# Patient Record
Sex: MALE | Race: WHITE | Employment: STUDENT | URBAN - METROPOLITAN AREA
[De-identification: names, ages, dates, MRNs, and addresses within clinical notes are randomized per-mention and may not be internally consistent; named-entity substitution may affect disease eponyms.]

---

## 2017-08-03 PROBLEM — F41.9 ANXIETY: Status: ACTIVE | Noted: 2017-08-03

## 2019-09-13 ENCOUNTER — OFFICE VISIT (OUTPATIENT)
Dept: INTERNAL MEDICINE CLINIC | Facility: CLINIC | Age: 24
End: 2019-09-13
Payer: COMMERCIAL

## 2019-09-13 VITALS
RESPIRATION RATE: 16 BRPM | TEMPERATURE: 98 F | WEIGHT: 175 LBS | DIASTOLIC BLOOD PRESSURE: 72 MMHG | SYSTOLIC BLOOD PRESSURE: 128 MMHG | HEIGHT: 67 IN | HEART RATE: 68 BPM | BODY MASS INDEX: 27.47 KG/M2

## 2019-09-13 DIAGNOSIS — L98.9 SKIN LESION OF LEFT LEG: Primary | ICD-10-CM

## 2019-09-13 PROCEDURE — 99203 OFFICE O/P NEW LOW 30 MIN: CPT | Performed by: INTERNAL MEDICINE

## 2019-09-13 NOTE — PROGRESS NOTES
Stephanie Hendricks  4/13/1995    Patient presents with:  New Patient: cn room 1: new patient here for growth on leg       Yayo García is a 22year old male who presents with a skin growth.      The patient notes a 3-5 year history of a sma respiratory distress. Musculoskeletal: No edema  Skin: Area of an erythematous papule that is nontender and firm, without overlying warmth involving the anterior left lower extremity spanning approximately 2 cm in diameter. No drainage.    Psychiatric: Nor

## 2020-03-27 ENCOUNTER — TELEPHONE (OUTPATIENT)
Dept: INTERNAL MEDICINE CLINIC | Facility: CLINIC | Age: 25
End: 2020-03-27

## 2020-03-27 DIAGNOSIS — K21.9 GASTROESOPHAGEAL REFLUX DISEASE, ESOPHAGITIS PRESENCE NOT SPECIFIED: ICD-10-CM

## 2020-03-27 DIAGNOSIS — R07.89 CHEST WALL PAIN: Primary | ICD-10-CM

## 2020-03-27 PROCEDURE — 99213 OFFICE O/P EST LOW 20 MIN: CPT | Performed by: INTERNAL MEDICINE

## 2020-03-27 NOTE — TELEPHONE ENCOUNTER
Virtual/Telephone Check-In    Harsha Nicko Lozoyadavid verbally consents to a [employer] on 03/27/20. Patient understands and accepts financial responsibility for any deductible, co-insurance and/or co-pays associated with this service.

## 2020-04-14 ENCOUNTER — VIRTUAL PHONE E/M (OUTPATIENT)
Dept: INTERNAL MEDICINE CLINIC | Facility: CLINIC | Age: 25
End: 2020-04-14
Payer: COMMERCIAL

## 2020-04-14 DIAGNOSIS — K21.9 GASTROESOPHAGEAL REFLUX DISEASE, ESOPHAGITIS PRESENCE NOT SPECIFIED: Primary | ICD-10-CM

## 2020-04-14 PROCEDURE — 99213 OFFICE O/P EST LOW 20 MIN: CPT | Performed by: INTERNAL MEDICINE

## 2020-04-14 RX ORDER — PANTOPRAZOLE SODIUM 40 MG/1
40 TABLET, DELAYED RELEASE ORAL
Qty: 90 TABLET | Refills: 0 | Status: SHIPPED | OUTPATIENT
Start: 2020-04-14 | End: 2020-04-20

## 2020-04-14 NOTE — PROGRESS NOTES
Alan Hendricks  4/13/1995    No chief complaint on file. Marjorie Mortimer is a 26year old male who presents with worsening GERD.     The patient notes a two-month history of a burning chest sensation and intermittent dry cough, that o old male who presents worsening GERD.     GERD:  Lifestyle management advised  Trial of pantoprazole   No associated dyspepsia; PUD or H. Pylori not suspected   If suboptimal response with above management will pursue GI evaluation    The patient indicates

## 2020-04-20 PROBLEM — R07.9 CHEST PAIN: Status: ACTIVE | Noted: 2020-04-20

## 2020-04-20 PROBLEM — R63.4 WEIGHT LOSS: Status: ACTIVE | Noted: 2020-04-20

## 2020-04-20 PROBLEM — R12 HEARTBURN: Status: ACTIVE | Noted: 2020-04-20

## 2020-04-21 ENCOUNTER — LAB ENCOUNTER (OUTPATIENT)
Dept: LAB | Facility: HOSPITAL | Age: 25
End: 2020-04-21
Attending: INTERNAL MEDICINE
Payer: COMMERCIAL

## 2020-04-21 DIAGNOSIS — R12 HEARTBURN: ICD-10-CM

## 2020-04-21 DIAGNOSIS — R63.4 WEIGHT LOSS: ICD-10-CM

## 2020-04-21 DIAGNOSIS — R07.9 CHEST PAIN, UNSPECIFIED TYPE: ICD-10-CM

## 2020-04-21 PROCEDURE — 85025 COMPLETE CBC W/AUTO DIFF WBC: CPT

## 2020-04-21 PROCEDURE — 83516 IMMUNOASSAY NONANTIBODY: CPT

## 2020-04-21 PROCEDURE — 82784 ASSAY IGA/IGD/IGG/IGM EACH: CPT

## 2020-04-21 PROCEDURE — 84443 ASSAY THYROID STIM HORMONE: CPT

## 2020-04-21 PROCEDURE — 83690 ASSAY OF LIPASE: CPT

## 2020-04-21 PROCEDURE — 80053 COMPREHEN METABOLIC PANEL: CPT

## 2020-04-21 PROCEDURE — 36415 COLL VENOUS BLD VENIPUNCTURE: CPT

## 2020-04-24 ENCOUNTER — HOSPITAL ENCOUNTER (OUTPATIENT)
Dept: CT IMAGING | Facility: HOSPITAL | Age: 25
Discharge: HOME | End: 2020-04-24
Attending: INTERNAL MEDICINE
Payer: COMMERCIAL

## 2020-04-24 DIAGNOSIS — R63.4 WEIGHT LOSS: ICD-10-CM

## 2020-04-24 DIAGNOSIS — R12 HEARTBURN: ICD-10-CM

## 2020-04-24 DIAGNOSIS — R07.9 CHEST PAIN, UNSPECIFIED TYPE: ICD-10-CM

## 2020-04-24 PROCEDURE — 71260 CT THORAX DX C+: CPT | Performed by: INTERNAL MEDICINE

## 2020-04-24 PROCEDURE — 74160 CT ABDOMEN W/CONTRAST: CPT | Performed by: INTERNAL MEDICINE

## 2020-05-20 ENCOUNTER — VIRTUAL PHONE E/M (OUTPATIENT)
Dept: INTERNAL MEDICINE CLINIC | Facility: CLINIC | Age: 25
End: 2020-05-20
Payer: COMMERCIAL

## 2020-05-20 DIAGNOSIS — F41.9 ANXIETY: ICD-10-CM

## 2020-05-20 DIAGNOSIS — K21.9 GASTROESOPHAGEAL REFLUX DISEASE, ESOPHAGITIS PRESENCE NOT SPECIFIED: Primary | ICD-10-CM

## 2020-05-20 PROCEDURE — 99213 OFFICE O/P EST LOW 20 MIN: CPT | Performed by: INTERNAL MEDICINE

## 2020-05-20 RX ORDER — SERTRALINE HYDROCHLORIDE 25 MG/1
25 TABLET, FILM COATED ORAL DAILY
Qty: 90 TABLET | Refills: 0 | Status: SHIPPED | OUTPATIENT
Start: 2020-05-20 | End: 2020-07-27

## 2020-05-20 NOTE — PROGRESS NOTES
Dada Hendricks  4/13/1995    No chief complaint on file. Cinda Fox is a 26year old male who presents as a follow-up.     The patient has a longstanding history of anxiety which was previously managed with sertraline 25 mg daily • ALPRAZolam 0.5 MG Oral Tab Take 1 tablet (0.5 mg total) by mouth daily as needed. 20 tablet 0   • Multiple Vitamins-Minerals (MULTIVITAMIN ADULT OR) Take by mouth.         No Known Allergies   Past Medical History:   Diagnosis Date   • Abdominal pain Ma understanding of these issues and agrees to the plan. TODAY'S ORDERS     No orders of the defined types were placed in this encounter.       Meds & Refills:  Requested Prescriptions     Signed Prescriptions Disp Refills   • Sertraline HCl 25 MG Oral Tab

## 2020-05-21 ENCOUNTER — APPOINTMENT (OUTPATIENT)
Dept: LAB | Facility: HOSPITAL | Age: 25
End: 2020-05-21
Attending: INTERNAL MEDICINE
Payer: COMMERCIAL

## 2020-05-21 DIAGNOSIS — R12 HEARTBURN: ICD-10-CM

## 2020-05-21 DIAGNOSIS — R07.9 CHEST PAIN, UNSPECIFIED TYPE: ICD-10-CM

## 2020-05-21 PROCEDURE — 87338 HPYLORI STOOL AG IA: CPT

## 2020-05-26 ENCOUNTER — APPOINTMENT (OUTPATIENT)
Dept: CT IMAGING | Facility: HOSPITAL | Age: 25
End: 2020-05-26
Attending: EMERGENCY MEDICINE
Payer: COMMERCIAL

## 2020-05-26 ENCOUNTER — HOSPITAL ENCOUNTER (EMERGENCY)
Facility: HOSPITAL | Age: 25
Discharge: HOME | End: 2020-05-26
Attending: EMERGENCY MEDICINE
Payer: COMMERCIAL

## 2020-05-26 VITALS
OXYGEN SATURATION: 96 % | HEIGHT: 68 IN | BODY MASS INDEX: 21.22 KG/M2 | HEART RATE: 57 BPM | DIASTOLIC BLOOD PRESSURE: 66 MMHG | SYSTOLIC BLOOD PRESSURE: 118 MMHG | TEMPERATURE: 98 F | RESPIRATION RATE: 18 BRPM | WEIGHT: 140 LBS

## 2020-05-26 DIAGNOSIS — R42 DIZZINESS: Primary | ICD-10-CM

## 2020-05-26 PROCEDURE — 70450 CT HEAD/BRAIN W/O DYE: CPT | Performed by: EMERGENCY MEDICINE

## 2020-05-26 PROCEDURE — 36415 COLL VENOUS BLD VENIPUNCTURE: CPT

## 2020-05-26 PROCEDURE — 80053 COMPREHEN METABOLIC PANEL: CPT | Performed by: EMERGENCY MEDICINE

## 2020-05-26 PROCEDURE — 99284 EMERGENCY DEPT VISIT MOD MDM: CPT

## 2020-05-26 PROCEDURE — 85025 COMPLETE CBC W/AUTO DIFF WBC: CPT | Performed by: EMERGENCY MEDICINE

## 2020-05-26 NOTE — ED INITIAL ASSESSMENT (HPI)
Reports feeling dizzy and lightheaded since Thursday. Mild nausea, intermittent mild h/a. Denies fevers or illness. Reports he just started taking Sertraline on 5/16 and thinks it may be associated with c/o.

## 2020-05-26 NOTE — ED PROVIDER NOTES
Patient Seen in: [facility] Emergency Department      History   Patient presents with:  Dizziness    Stated Complaint: dizziness    HPI    Patient is a pleasant 71-year-old male, presenting for evaluation of dizziness and lightheadedness.     Patient Triage Vitals [05/26/20 1328]   /68   Pulse 64   Resp 18   Temp 98 °F (36.7 °C)   Temp src Temporal   SpO2 100 %   O2 Device None (Room air)       Current:/66   Pulse 57   Temp 98 °F (36.7 °C) (Temporal)   Resp 18   Ht 172.7 cm (5' 8\")   Wt 63 RAINBOW DRAW LIGHT GREEN   RAINBOW DRAW GOLD   CBC W/ DIFFERENTIAL          Ct Brain Or Head (57646)    Result Date: 5/26/2020  PROCEDURE:  CT BRAIN OR HEAD (01583)  COMPARISON:  None.   INDICATIONS:  dizziness  TECHNIQUE:  Noncontrast CT scanning is perf verbalizes understanding and is comfortable with the plan as recommended. Patient ambulated freely and was subsequently discharged without incident.       Disposition and Plan     Clinical Impression:  Dizziness  (primary encounter diagnosis)    Dispositio

## 2020-05-29 NOTE — PROGRESS NOTES
Meredith Hendricks  4/13/1995    No chief complaint on file. Rodolfo Calderon is a 26year old male who presents as an ED follow-up.     The patient has a longstanding history of anxiety for which recent initiation of SSRI and short acting Bloating March 1st   • Chronic cough March 1st    Temporarily after eating   • Decorative tattoo 2015   • Fatigue Mid March   • Frequent use of laxatives Only in February   • Headache disorder March 15th   • Heartburn March 1st   • Loss of appetite Beginni requested or ordered in this encounter       Imaging & Consults:  None    No follow-ups on file. There are no Patient Instructions on file for this visit. All questions were answered and the patient agrees with the plan.      Thank you,  Roger Dickinson MD

## 2020-06-29 ENCOUNTER — LAB ENCOUNTER (OUTPATIENT)
Dept: LAB | Facility: HOSPITAL | Age: 25
End: 2020-06-29
Attending: INTERNAL MEDICINE
Payer: COMMERCIAL

## 2020-06-29 DIAGNOSIS — Z01.818 PRE-OP TESTING: ICD-10-CM

## 2020-06-29 NOTE — PROGRESS NOTES
Santy Hendricks  4/13/1995    No chief complaint on file. Isra Parham is a 26year old male who presents as a follow-up. The patient has a history of anxiety for which he has previously taken sertraline 25 mg daily.   However, appetite Beginning of March   • Stress Feb 1st   • Uncomfortable fullness after meals March 1st   • Weight loss March 20th      Patient Active Problem List:     Auditory processing disorder     BMI (body mass index), pediatric, 85% to less than 95% for age

## 2020-07-01 LAB — SARS-COV-2 RNA RESP QL NAA+PROBE: NOT DETECTED

## 2020-07-06 ENCOUNTER — TELEPHONE (OUTPATIENT)
Dept: INTERNAL MEDICINE CLINIC | Facility: CLINIC | Age: 25
End: 2020-07-06

## 2020-07-06 NOTE — TELEPHONE ENCOUNTER
Pt scheduled via [employer] Please advise re: symptoms     ----- Message -----   From: Niesha Hendricks   Sent: 7/5/2020  12:00 PM CDT   To: [facility]   Subject: Appointment scheduled from [place] [id number]       Appointment For: Ritesh Paez

## 2020-07-06 NOTE — TELEPHONE ENCOUNTER
Spoke with patient indicates for 3-4 weeks, when sitting to standing position or bend down feels lightheaded and slight headache, No dizziness, SOB, no CP, does not have BP cuff at home.  Patient has appt scheduled with AD on 7/9/2020 for further evaluation

## 2020-07-09 ENCOUNTER — OFFICE VISIT (OUTPATIENT)
Dept: INTERNAL MEDICINE CLINIC | Facility: CLINIC | Age: 25
End: 2020-07-09
Payer: COMMERCIAL

## 2020-07-09 VITALS
HEART RATE: 64 BPM | TEMPERATURE: 98 F | BODY MASS INDEX: 21.52 KG/M2 | SYSTOLIC BLOOD PRESSURE: 100 MMHG | HEIGHT: 68 IN | RESPIRATION RATE: 18 BRPM | WEIGHT: 142 LBS | DIASTOLIC BLOOD PRESSURE: 70 MMHG

## 2020-07-09 DIAGNOSIS — K21.9 GASTROESOPHAGEAL REFLUX DISEASE, ESOPHAGITIS PRESENCE NOT SPECIFIED: ICD-10-CM

## 2020-07-09 DIAGNOSIS — R42 LIGHTHEADEDNESS: Primary | ICD-10-CM

## 2020-07-09 DIAGNOSIS — F41.0 PANIC DISORDER: ICD-10-CM

## 2020-07-09 DIAGNOSIS — F41.9 ANXIETY: ICD-10-CM

## 2020-07-09 DIAGNOSIS — R00.1 SYMPTOMATIC BRADYCARDIA: ICD-10-CM

## 2020-07-09 PROCEDURE — 99214 OFFICE O/P EST MOD 30 MIN: CPT | Performed by: INTERNAL MEDICINE

## 2020-07-09 PROCEDURE — 93000 ELECTROCARDIOGRAM COMPLETE: CPT | Performed by: INTERNAL MEDICINE

## 2020-07-09 NOTE — PROGRESS NOTES
Krissy Hendricks  4/13/1995    Patient presents with: Follow - Up: MR rm 8 f/up in dizziness       Jose Arevalo is a 26year old male who presents with lightheadedness and observed low heart rate.     The patient describes a four-week hi Loss of appetite Beginning of March   • Stress Feb 1st   • Uncomfortable fullness after meals March 1st   • Weight loss March 20th      Patient Active Problem List:     Auditory processing disorder     BMI (body mass index), pediatric, 85% to less than 95% compared with prior study (2018)  2D echocardiogram ordered  Given the degree of symptoms and frequency, with persistent heart rate in the 40s, I have referred the patient to the cardiology service    Anxiety with panic disorder:  However his symptoms have

## 2020-07-13 ENCOUNTER — HOSPITAL ENCOUNTER (OUTPATIENT)
Dept: CV DIAGNOSTICS | Facility: HOSPITAL | Age: 25
Discharge: HOME | End: 2020-07-13
Attending: INTERNAL MEDICINE
Payer: COMMERCIAL

## 2020-07-13 DIAGNOSIS — R42 LIGHTHEADEDNESS: ICD-10-CM

## 2020-07-13 PROCEDURE — 93306 TTE W/DOPPLER COMPLETE: CPT | Performed by: INTERNAL MEDICINE

## 2020-07-14 ENCOUNTER — TELEPHONE (OUTPATIENT)
Dept: CARDIOLOGY | Age: 25
End: 2020-07-14

## 2020-07-27 ENCOUNTER — OFFICE VISIT (OUTPATIENT)
Dept: INTERNAL MEDICINE CLINIC | Facility: CLINIC | Age: 25
End: 2020-07-27
Payer: COMMERCIAL

## 2020-07-27 VITALS
WEIGHT: 139 LBS | DIASTOLIC BLOOD PRESSURE: 72 MMHG | BODY MASS INDEX: 21.07 KG/M2 | HEIGHT: 68 IN | TEMPERATURE: 98 F | HEART RATE: 72 BPM | RESPIRATION RATE: 18 BRPM | SYSTOLIC BLOOD PRESSURE: 110 MMHG

## 2020-07-27 DIAGNOSIS — R42 DIZZINESS: Primary | ICD-10-CM

## 2020-07-27 DIAGNOSIS — H53.9 VISUAL DISTURBANCES: ICD-10-CM

## 2020-07-27 DIAGNOSIS — F41.0 PANIC DISORDER: ICD-10-CM

## 2020-07-27 DIAGNOSIS — R00.1 SYMPTOMATIC BRADYCARDIA: ICD-10-CM

## 2020-07-27 DIAGNOSIS — F41.9 ANXIETY: ICD-10-CM

## 2020-07-27 PROCEDURE — 99214 OFFICE O/P EST MOD 30 MIN: CPT | Performed by: INTERNAL MEDICINE

## 2020-07-27 PROCEDURE — 3078F DIAST BP <80 MM HG: CPT | Performed by: INTERNAL MEDICINE

## 2020-07-27 PROCEDURE — 3074F SYST BP LT 130 MM HG: CPT | Performed by: INTERNAL MEDICINE

## 2020-07-27 PROCEDURE — 3008F BODY MASS INDEX DOCD: CPT | Performed by: INTERNAL MEDICINE

## 2020-07-27 RX ORDER — SERTRALINE HYDROCHLORIDE 25 MG/1
25 TABLET, FILM COATED ORAL DAILY
Qty: 30 TABLET | Refills: 0 | Status: SHIPPED | OUTPATIENT
Start: 2020-07-27 | End: 2020-08-17

## 2020-07-27 NOTE — PROGRESS NOTES
Boris Hendricks  4/13/1995    Patient presents with: Follow - Up: MR rm 8 still having dizziness when standing up       Joanna Schneider is a 26year old male who presents as a follow-up.     The patient describes frequent symptoms of anxie No Known Allergies   Past Medical History:   Diagnosis Date   • Abdominal pain March 20th   • Anxiety    • Belching March 1st   • Bloating March 1st   • Chronic cough March 1st    Temporarily after eating   • Decorative tattoo 2015   • Fatigue Mid Mar bilaterally without dysmetria. EOMI. PERRLA. Gait is normal.  Skin: Skin is warm and dry. No rash. Psychiatric: Normal mood and affect. ASSESSMENT AND PLAN:   Best Ba is a 26year old male who presents as a follow-up.     Episodic dizziness a

## 2020-07-29 ENCOUNTER — OFFICE VISIT (OUTPATIENT)
Dept: NEUROLOGY | Facility: CLINIC | Age: 25
End: 2020-07-29
Payer: COMMERCIAL

## 2020-07-29 VITALS
DIASTOLIC BLOOD PRESSURE: 70 MMHG | BODY MASS INDEX: 21 KG/M2 | WEIGHT: 140 LBS | SYSTOLIC BLOOD PRESSURE: 110 MMHG | HEART RATE: 62 BPM | RESPIRATION RATE: 16 BRPM

## 2020-07-29 DIAGNOSIS — R20.2 PARESTHESIA: ICD-10-CM

## 2020-07-29 DIAGNOSIS — M54.2 NECK PAIN: ICD-10-CM

## 2020-07-29 DIAGNOSIS — R51.9 HEADACHE DISORDER: ICD-10-CM

## 2020-07-29 DIAGNOSIS — R42 DIZZINESS: Primary | ICD-10-CM

## 2020-07-29 PROCEDURE — 99244 OFF/OP CNSLTJ NEW/EST MOD 40: CPT | Performed by: OTHER

## 2020-07-29 PROCEDURE — 3078F DIAST BP <80 MM HG: CPT | Performed by: OTHER

## 2020-07-29 PROCEDURE — 3074F SYST BP LT 130 MM HG: CPT | Performed by: OTHER

## 2020-07-29 NOTE — PROGRESS NOTES
Patient states the dizziness and lightheadedness started about 1-2 months ago. Patient states only lightheadedness while standing up. Patient has noticed an increase in weakness. Increase in headaches and on and off blurry vision.  Denies nausea or vomiting

## 2020-07-29 NOTE — PROGRESS NOTES
[facility]    Date of consult: 7/29/2020    CC/Reason for consult: dizziness, weakness, blurry vision, headache  Consult Requested by Gonzalo Cline MD    HPI: Poly Tony is a 26year old male with past medical history as li 7/29/2020 ), Disp: 180 tablet, Rfl: 1  Allergies:  No Known Allergies  Past Medical History:   Diagnosis Date   • Abdominal pain March 20th   • Anxiety    • Belching March 1st   • Bloating March 1st   • Chronic cough March 1st    Temporarily after eating Reviewed on 7/29/2020  Notes Reviewed on 7/29/2020  Labs Reviewed  on 7/29/2020    Assessment & Plan:  Headache disorder  Neck pain  Dizziness and lightheadedness, postural  Weakness   Anxiety     ddx MS, autonomic nerve disorder, vertigo/vestibulopathy, f

## 2020-07-30 ENCOUNTER — APPOINTMENT (OUTPATIENT)
Dept: LAB | Age: 25
End: 2020-07-30
Attending: PHYSICIAN ASSISTANT
Payer: COMMERCIAL

## 2020-07-30 DIAGNOSIS — D48.5 NEOPLASM OF UNCERTAIN BEHAVIOR OF SKIN: ICD-10-CM

## 2020-07-30 PROCEDURE — 88341 IMHCHEM/IMCYTCHM EA ADD ANTB: CPT

## 2020-07-30 PROCEDURE — 88342 IMHCHEM/IMCYTCHM 1ST ANTB: CPT

## 2020-08-03 ENCOUNTER — HOSPITAL ENCOUNTER (OUTPATIENT)
Dept: MRI IMAGING | Age: 25
Discharge: HOME | End: 2020-08-03
Attending: Other
Payer: COMMERCIAL

## 2020-08-03 DIAGNOSIS — R51.9 HEADACHE DISORDER: ICD-10-CM

## 2020-08-03 DIAGNOSIS — R42 DIZZINESS: ICD-10-CM

## 2020-08-03 DIAGNOSIS — M54.2 NECK PAIN: ICD-10-CM

## 2020-08-03 DIAGNOSIS — R20.2 PARESTHESIA: ICD-10-CM

## 2020-08-03 PROCEDURE — 70553 MRI BRAIN STEM W/O & W/DYE: CPT | Performed by: OTHER

## 2020-08-03 PROCEDURE — 72156 MRI NECK SPINE W/O & W/DYE: CPT | Performed by: OTHER

## 2020-08-03 PROCEDURE — A9575 INJ GADOTERATE MEGLUMI 0.1ML: HCPCS

## 2020-08-17 RX ORDER — SERTRALINE HYDROCHLORIDE 25 MG/1
25 TABLET, FILM COATED ORAL DAILY
Qty: 30 TABLET | Refills: 0 | Status: CANCELLED | OUTPATIENT
Start: 2020-08-17

## 2020-08-17 NOTE — TELEPHONE ENCOUNTER
Last OV: 7/27/20 acute f/u    Future Appointments   Date Time Provider Bibi Rabago   8/26/2020 10:40 AM MD ESTEFANI More        Latest labs: 4/21/20 CBC,CMP order by Matthew Gutierrez    Latest RX: Sertraline HCl 25 MG Oral Tab 30 tabs 0 refills on 7/27/20    Per protocol, not on protocol. Rx pending.

## 2020-08-22 RX ORDER — SERTRALINE HYDROCHLORIDE 25 MG/1
TABLET, FILM COATED ORAL
Qty: 30 TABLET | Refills: 0 | OUTPATIENT
Start: 2020-08-22

## 2020-08-25 ENCOUNTER — TELEPHONE (OUTPATIENT)
Dept: INTERNAL MEDICINE CLINIC | Facility: CLINIC | Age: 25
End: 2020-08-25

## 2020-08-25 NOTE — TELEPHONE ENCOUNTER
Received office visit notes from [facility]. Placed in AD bin for review.

## 2020-10-07 RX ORDER — ALPRAZOLAM 0.5 MG/1
0.5 TABLET ORAL DAILY PRN
Qty: 20 TABLET | Refills: 0 | Status: SHIPPED | OUTPATIENT
Start: 2020-10-07 | End: 2020-10-22

## 2020-10-07 RX ORDER — FLUTICASONE PROPIONATE 50 MCG
2 SPRAY, SUSPENSION (ML) NASAL DAILY
Qty: 1 BOTTLE | Refills: 0 | Status: SHIPPED | OUTPATIENT
Start: 2020-10-07 | End: 2021-06-02

## 2020-10-07 NOTE — TELEPHONE ENCOUNTER
Last Ov: 7/27/20, AD, F/U  Last labs: CBC, CMP, Lipid, TSH w Ref, Vit D 10/6/20  Last Rx: alprazolam 0.5mg, #20, 0R 8/10/20    No future appointments. Per Protocol - alprazolam not on protocol, Rx pending. Other pass, refill sent.

## 2020-10-13 ENCOUNTER — LAB ENCOUNTER (OUTPATIENT)
Dept: LAB | Age: 25
End: 2020-10-13
Attending: INTERNAL MEDICINE
Payer: COMMERCIAL

## 2020-10-13 DIAGNOSIS — E55.9 VITAMIN D DEFICIENCY: ICD-10-CM

## 2020-10-13 DIAGNOSIS — Z13.29 THYROID DISORDER SCREENING: ICD-10-CM

## 2020-10-13 DIAGNOSIS — Z13.0 SCREENING FOR BLOOD DISEASE: ICD-10-CM

## 2020-10-13 DIAGNOSIS — Z13.220 SCREENING FOR LIPID DISORDERS: ICD-10-CM

## 2020-10-13 DIAGNOSIS — Z00.00 LABORATORY EXAMINATION ORDERED AS PART OF A COMPLETE PHYSICAL EXAMINATION: ICD-10-CM

## 2020-10-13 DIAGNOSIS — Z13.228 SCREENING FOR METABOLIC DISORDER: ICD-10-CM

## 2020-10-13 PROCEDURE — 36415 COLL VENOUS BLD VENIPUNCTURE: CPT

## 2020-10-13 PROCEDURE — 82306 VITAMIN D 25 HYDROXY: CPT

## 2020-10-13 PROCEDURE — 84443 ASSAY THYROID STIM HORMONE: CPT

## 2020-10-13 PROCEDURE — 80053 COMPREHEN METABOLIC PANEL: CPT

## 2020-10-13 PROCEDURE — 85025 COMPLETE CBC W/AUTO DIFF WBC: CPT

## 2020-10-13 PROCEDURE — 80061 LIPID PANEL: CPT

## 2020-10-16 RX ORDER — SERTRALINE HYDROCHLORIDE 100 MG/1
100 TABLET, FILM COATED ORAL DAILY
Qty: 90 TABLET | Refills: 0 | Status: SHIPPED | OUTPATIENT
Start: 2020-10-16 | End: 2020-11-13

## 2020-10-16 NOTE — TELEPHONE ENCOUNTER
Received fax from pharmacy to verify sig. Sertraline sent in 10/14/20 take 1/2 tab (50mg) Q day but pt states it has recently been increased to 100 mg Q day. Please review and advise. Pending 100 mg po Q day order.

## 2020-10-20 ENCOUNTER — OFFICE VISIT (OUTPATIENT)
Dept: INTERNAL MEDICINE CLINIC | Facility: CLINIC | Age: 25
End: 2020-10-20
Payer: COMMERCIAL

## 2020-10-20 VITALS
BODY MASS INDEX: 21.22 KG/M2 | HEART RATE: 68 BPM | TEMPERATURE: 98 F | SYSTOLIC BLOOD PRESSURE: 104 MMHG | DIASTOLIC BLOOD PRESSURE: 66 MMHG | WEIGHT: 140 LBS | HEIGHT: 68 IN

## 2020-10-20 DIAGNOSIS — F41.9 ANXIETY: ICD-10-CM

## 2020-10-20 DIAGNOSIS — Z00.00 ANNUAL PHYSICAL EXAM: Primary | ICD-10-CM

## 2020-10-20 DIAGNOSIS — E55.9 VITAMIN D DEFICIENCY: ICD-10-CM

## 2020-10-20 DIAGNOSIS — F41.0 PANIC DISORDER: ICD-10-CM

## 2020-10-20 PROCEDURE — 3078F DIAST BP <80 MM HG: CPT | Performed by: INTERNAL MEDICINE

## 2020-10-20 PROCEDURE — 99395 PREV VISIT EST AGE 18-39: CPT | Performed by: INTERNAL MEDICINE

## 2020-10-20 PROCEDURE — 3074F SYST BP LT 130 MM HG: CPT | Performed by: INTERNAL MEDICINE

## 2020-10-20 PROCEDURE — 3008F BODY MASS INDEX DOCD: CPT | Performed by: INTERNAL MEDICINE

## 2020-10-20 NOTE — PROGRESS NOTES
Karo Hendricks  4/13/1995    Patient presents with:  Physical: AJ rm 6 annual PE      HPI:   Lorene Pate is a 26year old male who presents for an annual physical examination.     The patient has a history of anxiety, the trigger of which is multip • Weight loss March 20th      Patient Active Problem List:     Auditory processing disorder     BMI (body mass index), pediatric, 85% to less than 95% for age     Anxiety     Weight loss     Chest pain     Heartburn     Past Surgical History:   Procedure immunization: Administered today  Screening for testicular cancer: Normal examination    Anxiety and panic:   Significant improvement and now controlled  No further panic attacks  Continue sertraline 75 mg daily  Continue once weekly behavioral therapy

## 2020-10-22 RX ORDER — ALPRAZOLAM 0.5 MG/1
0.5 TABLET ORAL DAILY PRN
Qty: 20 TABLET | Refills: 0 | Status: SHIPPED | OUTPATIENT
Start: 2020-10-22 | End: 2020-12-18

## 2020-10-22 NOTE — TELEPHONE ENCOUNTER
Last VISIT: 10/20/2020    Last REFILL: 10/07/2020  ALPRAZOLAM qty 20/ 0 refills     Last LABS: 10/13/2020   CMP, LIPID PANEL, TSH WITH REFLEX TO FREE T4, VITAMIN D, CBC WITH DIFFERENTIAL WITH PLATELET    No future appointments. Per PROTOCOL?  NOT ON SD

## 2020-11-13 NOTE — TELEPHONE ENCOUNTER
Last VISIT 10/20/20    Last REFILL 10/16/20 qty 90 w/0 refills    Last LABS 10/13/20 CBC, Vit D, TSH, Lipid, CMP done    No future appointments. Per PROTOCOL? Not on protocol      Please Approve or Deny.

## 2020-12-18 RX ORDER — ALPRAZOLAM 0.5 MG/1
0.5 TABLET ORAL DAILY PRN
Qty: 20 TABLET | Refills: 0 | Status: CANCELLED | OUTPATIENT
Start: 2020-12-18

## 2020-12-18 RX ORDER — SERTRALINE HYDROCHLORIDE 25 MG/1
25 TABLET, FILM COATED ORAL DAILY
Qty: 30 TABLET | Refills: 0 | OUTPATIENT
Start: 2020-12-18

## 2020-12-18 NOTE — TELEPHONE ENCOUNTER
Last Ov: 10/20/20, AD, CPE  Last labs: CBC, Vit D, TSH w Ref, Lipid, CMP 10/13/20  Last Rx:  Alprazolam 0.5mg, #20, 0R 10/22/20  Sertraline 25mg, #30, 0R 12/14/20    No future appointments. Per Protocol - neither on protocol, Rx pending.

## 2020-12-29 ENCOUNTER — PATIENT MESSAGE (OUTPATIENT)
Dept: INTERNAL MEDICINE CLINIC | Facility: CLINIC | Age: 25
End: 2020-12-29

## 2020-12-29 NOTE — TELEPHONE ENCOUNTER
From: Scarlet Hendricks  To: Kartik Adhikari MD  Sent: 12/29/2020 11:58 AM CST  Subject: Other    Hi  I've been off sertraline for five days now and have been feeling dizzy. It's mild but persistent.  Last time I was off sertraline I was dizzy as well but it o

## 2021-01-28 RX ORDER — SERTRALINE HYDROCHLORIDE 25 MG/1
25 TABLET, FILM COATED ORAL DAILY
Qty: 90 TABLET | Refills: 0 | Status: SHIPPED | OUTPATIENT
Start: 2021-01-28 | End: 2021-03-11

## 2021-02-01 RX ORDER — ALPRAZOLAM 0.5 MG/1
0.5 TABLET ORAL DAILY PRN
Qty: 20 TABLET | Refills: 0 | Status: SHIPPED | OUTPATIENT
Start: 2021-02-01 | End: 2021-03-17

## 2021-02-01 NOTE — TELEPHONE ENCOUNTER
Last VISIT 10/20/20    Last REFILL 12/18/20 qty 20 w/0 refills    Last LABS 10/13/20 CBC, CMP, Lipid, TSH, Vit D done    No Future Appointments        Per PROTOCOL? Not on protocol      Please Approve or Deny.

## 2021-03-05 ENCOUNTER — HOSPITAL ENCOUNTER (EMERGENCY)
Facility: HOSPITAL | Age: 26
Discharge: HOME | End: 2021-03-05
Attending: EMERGENCY MEDICINE
Payer: COMMERCIAL

## 2021-03-05 ENCOUNTER — APPOINTMENT (OUTPATIENT)
Dept: GENERAL RADIOLOGY | Facility: HOSPITAL | Age: 26
End: 2021-03-05
Attending: EMERGENCY MEDICINE
Payer: COMMERCIAL

## 2021-03-05 VITALS
WEIGHT: 140 LBS | HEART RATE: 68 BPM | BODY MASS INDEX: 20.73 KG/M2 | OXYGEN SATURATION: 99 % | SYSTOLIC BLOOD PRESSURE: 132 MMHG | TEMPERATURE: 99 F | RESPIRATION RATE: 16 BRPM | HEIGHT: 69 IN | DIASTOLIC BLOOD PRESSURE: 74 MMHG

## 2021-03-05 DIAGNOSIS — R05.9 COUGH: Primary | ICD-10-CM

## 2021-03-05 PROCEDURE — 99283 EMERGENCY DEPT VISIT LOW MDM: CPT

## 2021-03-05 PROCEDURE — 71045 X-RAY EXAM CHEST 1 VIEW: CPT | Performed by: EMERGENCY MEDICINE

## 2021-03-06 LAB — SARS-COV-2 RNA RESP QL NAA+PROBE: NOT DETECTED

## 2021-03-06 NOTE — ED INITIAL ASSESSMENT (HPI)
Pt to ED for c/o inttermitent dry cough x 2 days. Pt stated he vaped CBD last wk 2/24 & 2/25, none yesterday & today. Pt stated that he \"read something on the internet that you're not supposed to vape it since it leads to Pneumonia. \" Pt presents not in r

## 2021-03-08 ENCOUNTER — TELEPHONE (OUTPATIENT)
Dept: INTERNAL MEDICINE CLINIC | Facility: CLINIC | Age: 26
End: 2021-03-08

## 2021-03-08 NOTE — TELEPHONE ENCOUNTER
Pt made an appt with AD for the below. AD, please advise. If more time is needed for appt.   High TE, please advise      Anxiety and medication    Future Appointments   Date Time Provider Bibi Gem   3/11/2021  8:40 AM Angle Soto MD [facility]

## 2021-03-08 NOTE — TELEPHONE ENCOUNTER
Pt scheduled for 20 min via [email or web address] for anxiety. Please advise if okay or it has to be moved to 40 min appt. Next 40 min in 1 week.

## 2021-03-11 NOTE — PROGRESS NOTES
Meredith Hendricks  4/13/1995    Patient presents with: Anxiety: RG rm 1 f/u anxiety and medications   Allergies: possible allergies      SUBJECTIVE   Kisha Rawls is a 26year old male who presents as a follow-up.     The patient has a long-standing h Temporarily after eating   • Decorative tattoo 2015   • Fatigue Mid March   • Frequent use of laxatives Only in February   • Headache disorder March 15th   • Heartburn March 1st   • Loss of appetite Beginning of March   • Stress Feb 1st   • Uncomfortable f to continue to hold sertraline; would like to defer medical management to psychiatry service  Continue infrequent alprazolam use  Continue behavioral therapy  Behavioral health navigator ordered    Chronic seasonal allergies:  Stable   Continue current man

## 2021-03-18 RX ORDER — ALPRAZOLAM 0.5 MG/1
0.5 TABLET ORAL DAILY PRN
Qty: 20 TABLET | Refills: 0 | Status: SHIPPED | OUTPATIENT
Start: 2021-03-18 | End: 2021-04-26

## 2021-03-18 NOTE — TELEPHONE ENCOUNTER
Last VISIT 03/11/21    Last REFILL 02/01/21 qty 20 w/0 refills    Last LABS 03/05/21 Covid test done    Future Appointments   No Future Appointments      Per PROTOCOL? Not on protocol      Please Approve or Deny.

## 2021-04-27 RX ORDER — ALPRAZOLAM 0.5 MG/1
0.5 TABLET ORAL DAILY PRN
Qty: 20 TABLET | Refills: 0 | Status: SHIPPED | OUTPATIENT
Start: 2021-04-27 | End: 2021-06-20

## 2021-04-27 NOTE — TELEPHONE ENCOUNTER
Last VISIT 03/11/21    Last REFILL 03/18/21 qty 20 w/0 refills    Last LABS 03/05/21 Covid test done    No Future Appointments      Per PROTOCOL? Not on protocol      Please Approve or Deny.

## 2021-04-28 ENCOUNTER — TELEPHONE (OUTPATIENT)
Dept: INTERNAL MEDICINE CLINIC | Facility: CLINIC | Age: 26
End: 2021-04-28

## 2021-04-28 NOTE — TELEPHONE ENCOUNTER
Pt made appt through [employer] for only 20 min, AD advise if ok or needs to re-schedule?       Future Appointments   Date Time Provider Bibi Rabago   5/3/2021  3:00 PM Elysa Cooks, MD [facility]     Try a different ssri/anti-anxiety medic

## 2021-04-30 NOTE — PROGRESS NOTES
Ab Hendricks  4/13/1995    Patient presents with:  Medication Follow-Up: VIANCA rm 7 discuss changing anxiety meds. noticing heart palptations started 2 days ago and headaches with facial pain, finger pains over a week.  tingling of calves when sitting Heartburn March 1st   • Loss of appetite Beginning of March   • Stress Feb 1st   • Uncomfortable fullness after meals March 1st   • Weight loss March 20th      Patient Active Problem List:     Auditory processing disorder     BMI (body mass index), pediatr placed in this encounter. Meds & Refills:  Requested Prescriptions     Signed Prescriptions Disp Refills   • FLUoxetine HCl 10 MG Oral Cap 30 capsule 0     Sig: Take 1 capsule (10 mg total) by mouth daily.        Imaging & Consults:  None    No follow-

## 2021-05-24 RX ORDER — FLUOXETINE 10 MG/1
CAPSULE ORAL
Qty: 30 CAPSULE | Refills: 0 | Status: SHIPPED | OUTPATIENT
Start: 2021-05-24 | End: 2021-06-10

## 2021-05-24 NOTE — TELEPHONE ENCOUNTER
Last VISIT 04/30/21    Last REFILL 04/30/21 qty 30 w/0 refills    Last LABS 03/05/21 Covid test done    No Future Appointments      Per PROTOCOL? Not on protocol      Please Approve or Deny.

## 2021-06-02 RX ORDER — FLUTICASONE PROPIONATE 50 MCG
2 SPRAY, SUSPENSION (ML) NASAL DAILY
Qty: 16 G | Refills: 0 | Status: SHIPPED | OUTPATIENT
Start: 2021-06-02

## 2021-06-18 RX ORDER — FLUOXETINE 10 MG/1
10 CAPSULE ORAL DAILY
Qty: 90 CAPSULE | Refills: 1 | OUTPATIENT
Start: 2021-06-18

## 2021-06-18 NOTE — TELEPHONE ENCOUNTER
Last VISIT 04/30/21    Last REFILL 06/10/21 qty 30 w/0 refills    Last LABS 03/05/21 Covid test done    No Future Appointments      Per PROTOCOL? Not on protocol      Please Approve or Deny.

## 2021-06-21 RX ORDER — ALPRAZOLAM 0.5 MG/1
0.5 TABLET ORAL DAILY PRN
Qty: 20 TABLET | Refills: 0 | Status: SHIPPED | OUTPATIENT
Start: 2021-06-21 | End: 2021-07-08

## 2021-06-21 NOTE — TELEPHONE ENCOUNTER
Last VISIT 04/30/21    Last REFILL 04/27/21 qty 20 w/0 refills    Last LABS 03/05/21 Covid test done    No Future Appointments      Per PROTOCOL? Not on protocol    Please Approve or Deny.

## 2021-07-02 ENCOUNTER — PATIENT MESSAGE (OUTPATIENT)
Dept: INTERNAL MEDICINE CLINIC | Facility: CLINIC | Age: 26
End: 2021-07-02

## 2021-07-06 RX ORDER — PANTOPRAZOLE SODIUM 40 MG/1
40 TABLET, DELAYED RELEASE ORAL
Qty: 180 TABLET | Refills: 1 | Status: SHIPPED | OUTPATIENT
Start: 2021-07-06 | End: 2021-10-04

## 2021-07-06 NOTE — TELEPHONE ENCOUNTER
Last VISIT 04/30/21     Last REFILL  06/29/20 qty 180 w/1 refill    Last LABS 03/05/21 Covid test done    No Future Appointments      Please Approve or Deny.

## 2021-07-06 NOTE — TELEPHONE ENCOUNTER
From: Frank Hendricks  To: Juan Morrissey MD  Sent: 7/2/2021 4:46 PM CDT  Subject: Prescription Question    Could I get a refill for the protonix I take.  [facility]

## 2021-07-07 ENCOUNTER — TELEPHONE (OUTPATIENT)
Dept: INTERNAL MEDICINE CLINIC | Facility: CLINIC | Age: 26
End: 2021-07-07

## 2021-07-07 NOTE — TELEPHONE ENCOUNTER
Ok to wait to 7/8? Patient made appointment on [facility].     Appointment For: Ivette Sampson ([id number])   Visit Type: [address] [place] [address] (2014)      7/9/2021    11:00 AM  20 mins. Dion Santiago MD          [facility] [address]      Patient Comments:   Would l

## 2021-07-08 RX ORDER — ALPRAZOLAM 0.5 MG/1
0.5 TABLET ORAL DAILY PRN
Qty: 20 TABLET | Refills: 0 | Status: SHIPPED | OUTPATIENT
Start: 2021-07-08 | End: 2021-08-08

## 2021-07-08 NOTE — TELEPHONE ENCOUNTER
Pt scheduled MyChart appt. States has felt intermittently light-headed for 3 days. He feels this is anxiety related, as it only happens when feeling anxious. States when anxious, also sometimes feels chest tightness/sob.  States all symptoms resolve when an

## 2021-07-08 NOTE — TELEPHONE ENCOUNTER
Last VISIT 04/30/21     Last REFILL 06/21/21 qty 20 w/0 refills    Last LABS 03/05/21 Covid test done    Future Appointments   Date Time Provider Bibi Rabago   7/9/2021 11:00 AM Suzette Logan MD [facility]         Per PROTOCOL?  Not on ravinder

## 2021-07-09 NOTE — PROGRESS NOTES
Talib Hendricks  4/13/1995    Patient presents with:  Lightheadedness: SN RM 6; x 5 days, spacey/dizziness, +racing heart/palpitations, thinks to be related to increase of Fluoxetine      SUBJECTIVE   Cleavon Fatemeh is a 23year old male who presents tablet (0.5 mg total) by mouth daily as needed. 20 tablet 0   • Pantoprazole Sodium 40 MG Oral Tab EC Take 1 tablet (40 mg total) by mouth 2 (two) times daily before meals.  180 tablet 1   • Fluticasone Propionate 50 MCG/ACT Nasal Suspension 2 sprays by [place] Neck supple. Normal carotid pulses. No masses. Cardiovascular: Normal rate, regular rhythm and intact distal pulses. No murmur, rubs or gallops. Pulmonary/Chest: Effort normal and breath sounds normal. No respiratory distress. Abdominal: Soft.  Bowel s follow-ups on file. There are no Patient Instructions on file for this visit. All questions were answered and the patient agrees with the plan.      Thank you,  Carlos Simpson MD

## 2021-08-09 RX ORDER — ALPRAZOLAM 0.5 MG/1
0.5 TABLET ORAL DAILY PRN
Qty: 20 TABLET | Refills: 0 | Status: SHIPPED | OUTPATIENT
Start: 2021-08-09 | End: 2021-09-23

## 2021-08-09 NOTE — TELEPHONE ENCOUNTER
Last VISIT 07/09/21    Last REFILL 07/08/21 qty 20 w/0 refills    Last LABS 03/05/21 Covid test done    No Future Appointments      Per PROTOCOL? Not on protocol      Please Approve or Deny.

## 2021-08-26 ENCOUNTER — TELEPHONE (OUTPATIENT)
Dept: INTERNAL MEDICINE CLINIC | Facility: CLINIC | Age: 26
End: 2021-08-26

## 2021-08-26 DIAGNOSIS — Z11.1 SCREENING FOR TUBERCULOSIS: Primary | ICD-10-CM

## 2021-08-26 NOTE — TELEPHONE ENCOUNTER
Pt dropping off physical exam form to be completed by AD. 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 Also included is the list of vaccines he needs. PT isn't sure what if any he already has. Placed in AD folder at from desk. Please call him at [phone].

## 2021-08-30 ENCOUNTER — HOSPITAL ENCOUNTER (EMERGENCY)
Facility: HOSPITAL | Age: 26
Discharge: LEFT BEFORE DISCHARGE | End: 2021-08-30
Payer: COMMERCIAL

## 2021-08-31 ENCOUNTER — TELEPHONE (OUTPATIENT)
Dept: INTERNAL MEDICINE CLINIC | Facility: CLINIC | Age: 26
End: 2021-08-31

## 2021-08-31 NOTE — TELEPHONE ENCOUNTER
Spoke with pt, he is not wanting to follow-up at this time. He ended up not being seen at ER. Took a covid test and it was negative. He will call back if anything changes.

## 2021-09-04 ENCOUNTER — LAB ENCOUNTER (OUTPATIENT)
Dept: LAB | Facility: HOSPITAL | Age: 26
End: 2021-09-04
Attending: INTERNAL MEDICINE
Payer: COMMERCIAL

## 2021-09-04 DIAGNOSIS — Z11.1 SCREENING FOR TUBERCULOSIS: ICD-10-CM

## 2021-09-04 PROCEDURE — 36415 COLL VENOUS BLD VENIPUNCTURE: CPT

## 2021-09-04 PROCEDURE — 86480 TB TEST CELL IMMUN MEASURE: CPT

## 2021-09-07 LAB
M TB IFN-G CD4+ T-CELLS BLD-ACNC: 0.02 IU/ML
M TB TUBERC IFN-G BLD QL: NEGATIVE
M TB TUBERC IGNF/MITOGEN IGNF CONTROL: >10 IU/ML
QFT TB1 AG MINUS NIL: 0.01 IU/ML
QFT TB2 AG MINUS NIL: 0.01 IU/ML

## 2021-09-08 ENCOUNTER — TELEPHONE (OUTPATIENT)
Dept: INTERNAL MEDICINE CLINIC | Facility: CLINIC | Age: 26
End: 2021-09-08

## 2021-09-08 NOTE — TELEPHONE ENCOUNTER
Future Appointments   Date Time Provider Bibi Rabago   9/9/2021  2:20 PM Jadon Da Silva MD [facility]     Pt sched appt for \"Reoccurring headaches with   New/odd frequent chest sensation\" please call to triage

## 2021-09-08 NOTE — TELEPHONE ENCOUNTER
Pt reports thinks the \"chest sensation\" is \"heartburn or something\". Has been going on since last week, intermittently.  Reports mild burning sensation to epigastric area, but also states sternum tender and occasionally feels in left chest. When felt in

## 2021-09-09 NOTE — PROGRESS NOTES
Kat Hendricks  4/13/1995    Patient presents with:  Headache: RG rm 1 Recurrent headaches x 2 months, tenderness of chest      SUBJECTIVE   Blake Cadena is a 23year old male who presents with a chronic headache.     The patient describes a near 2- MCG/ACT Nasal Suspension 2 sprays by Each Nare route daily. 16 g 0   • melatonin 1 MG Oral Tab Take 2 mg by mouth nightly. • Multiple Vitamins-Minerals (MULTIVITAMIN ADULT OR) Take by mouth.      • hydrOXYzine 25 MG Oral Tab Take 1 tablet (25 mg total) Conjunctivae wnl. Neck: Normal range of motion. Neck supple. Normal carotid pulses. No masses. Cardiovascular: Normal rate, regular rhythm and intact distal pulses. No murmur, rubs or gallops.    Pulmonary/Chest: Effort normal and breath sounds normal. types were placed in this encounter. Meds & Refills:  Requested Prescriptions      No prescriptions requested or ordered in this encounter       Imaging & Consults:  None    No follow-ups on file.   There are no Patient Instructions on file for this vi

## 2021-09-10 ENCOUNTER — TELEPHONE (OUTPATIENT)
Dept: INTERNAL MEDICINE CLINIC | Facility: CLINIC | Age: 26
End: 2021-09-10

## 2021-09-10 NOTE — TELEPHONE ENCOUNTER
Pt stated he will be sending a [employer] message and attaching a document from  His school today that he needs the following vaccines: Rubella Mumps and Rubeola?  Please advise

## 2021-09-20 ENCOUNTER — NURSE ONLY (OUTPATIENT)
Dept: INTERNAL MEDICINE CLINIC | Facility: CLINIC | Age: 26
End: 2021-09-20
Payer: COMMERCIAL

## 2021-09-20 PROCEDURE — 90471 IMMUNIZATION ADMIN: CPT | Performed by: INTERNAL MEDICINE

## 2021-09-20 PROCEDURE — 90715 TDAP VACCINE 7 YRS/> IM: CPT | Performed by: INTERNAL MEDICINE

## 2021-09-21 ENCOUNTER — OFFICE VISIT (OUTPATIENT)
Dept: NEUROLOGY | Facility: CLINIC | Age: 26
End: 2021-09-21
Payer: COMMERCIAL

## 2021-09-21 ENCOUNTER — LAB ENCOUNTER (OUTPATIENT)
Dept: LAB | Age: 26
End: 2021-09-21
Attending: INTERNAL MEDICINE
Payer: COMMERCIAL

## 2021-09-21 VITALS
SYSTOLIC BLOOD PRESSURE: 118 MMHG | BODY MASS INDEX: 21 KG/M2 | HEART RATE: 60 BPM | WEIGHT: 144 LBS | RESPIRATION RATE: 16 BRPM | DIASTOLIC BLOOD PRESSURE: 64 MMHG

## 2021-09-21 DIAGNOSIS — R51.9 HEADACHE DISORDER: Primary | ICD-10-CM

## 2021-09-21 DIAGNOSIS — Z01.84 IMMUNITY STATUS TESTING: ICD-10-CM

## 2021-09-21 LAB
HBV SURFACE AB SER QL: NONREACTIVE
HBV SURFACE AB SERPL IA-ACNC: <3.1 MIU/ML
RUBV IGG SER QL: POSITIVE
RUBV IGG SER-ACNC: 463.5 IU/ML (ref 10–?)

## 2021-09-21 PROCEDURE — 86765 RUBEOLA ANTIBODY: CPT

## 2021-09-21 PROCEDURE — 3078F DIAST BP <80 MM HG: CPT | Performed by: OTHER

## 2021-09-21 PROCEDURE — 99214 OFFICE O/P EST MOD 30 MIN: CPT | Performed by: OTHER

## 2021-09-21 PROCEDURE — 36415 COLL VENOUS BLD VENIPUNCTURE: CPT

## 2021-09-21 PROCEDURE — 86706 HEP B SURFACE ANTIBODY: CPT

## 2021-09-21 PROCEDURE — 86735 MUMPS ANTIBODY: CPT

## 2021-09-21 PROCEDURE — 3074F SYST BP LT 130 MM HG: CPT | Performed by: OTHER

## 2021-09-21 PROCEDURE — 86787 VARICELLA-ZOSTER ANTIBODY: CPT

## 2021-09-21 PROCEDURE — 86762 RUBELLA ANTIBODY: CPT

## 2021-09-21 RX ORDER — DIVALPROEX SODIUM 250 MG/1
250 TABLET, EXTENDED RELEASE ORAL DAILY
Qty: 30 TABLET | Refills: 1 | Status: SHIPPED | OUTPATIENT
Start: 2021-09-21 | End: 2021-11-16

## 2021-09-21 NOTE — PROGRESS NOTES
[facility] Outpatient Progress Note  Date of service: 9/21/2021    Patient here for a follow-up visit for new symptoms: worsening daily headache for 2 months. States headache is not high grade but more of a \"background lingering headache\".  OTC meds • Frequent use of laxatives Only in February   • Headache disorder March 15th   • Heartburn March 1st   • Loss of appetite Beginning of March   • Stress Feb 1st   • Uncomfortable fullness after meals March 1st   • Weight loss March 20th     Past Surgical

## 2021-09-22 ENCOUNTER — TELEPHONE (OUTPATIENT)
Dept: INTERNAL MEDICINE CLINIC | Facility: CLINIC | Age: 26
End: 2021-09-22

## 2021-09-22 DIAGNOSIS — Z78.9 NOT IMMUNE TO HEPATITIS B VIRUS: Primary | ICD-10-CM

## 2021-09-22 LAB
MEV IGG SER-ACNC: >300 AU/ML (ref 16.5–?)
MUV IGG SER IA-ACNC: >300 AU/ML (ref 11–?)
VZV IGG SER IA-ACNC: 223.3 (ref 165–?)

## 2021-09-22 NOTE — TELEPHONE ENCOUNTER
Pt isn't sure if he should get a Hep B shot and wanted to confirm. Pt would like a call back to schedule if he needs it.  Please advise

## 2021-09-23 NOTE — TELEPHONE ENCOUNTER
Future Appointments   Date Time Provider Bibi Rabago   9/28/2021  8:30 AM [facility] N[facility]SE [facility]     Please enter order for #1 Hep B

## 2021-09-23 NOTE — TELEPHONE ENCOUNTER
Been Following AD  Last OV 9/9/21  Last CPE 10/20/20  Last Labs CBC, Vit D, TSH w Ref, Lipid, CMP 10/13/20    Last Rx fill Alprazolam 0.5mg #20 0R 8/9/21    Future Appointments   Date Time Provider Bibi Rabago   9/28/2021  8:30 AM [facility] NURSE [facility]

## 2021-09-24 RX ORDER — ALPRAZOLAM 0.5 MG/1
0.5 TABLET ORAL DAILY PRN
Qty: 20 TABLET | Refills: 0 | Status: SHIPPED | OUTPATIENT
Start: 2021-09-24 | End: 2021-11-16

## 2021-09-28 ENCOUNTER — NURSE ONLY (OUTPATIENT)
Dept: INTERNAL MEDICINE CLINIC | Facility: CLINIC | Age: 26
End: 2021-09-28
Payer: COMMERCIAL

## 2021-09-28 PROCEDURE — 90471 IMMUNIZATION ADMIN: CPT | Performed by: INTERNAL MEDICINE

## 2021-09-28 PROCEDURE — 90746 HEPB VACCINE 3 DOSE ADULT IM: CPT | Performed by: INTERNAL MEDICINE

## 2021-09-30 RX ORDER — FLUOXETINE 10 MG/1
10 CAPSULE ORAL DAILY
Qty: 90 CAPSULE | Refills: 1 | OUTPATIENT
Start: 2021-09-30

## 2021-09-30 NOTE — TELEPHONE ENCOUNTER
Last VISIT 09/09/21    Last CPE 10/20/20    Last REFILL 07/09/21 qty 90 w/0 refills     Last LABS 09/21/21 MMR, Varicella, Hep B done    No Future Appointments      Per PROTOCOL? Not on protocol      Please Approve or Deny.

## 2021-10-07 ENCOUNTER — APPOINTMENT (OUTPATIENT)
Dept: OTHER | Facility: HOSPITAL | Age: 26
End: 2021-10-07
Attending: PREVENTIVE MEDICINE

## 2021-10-07 ENCOUNTER — PATIENT MESSAGE (OUTPATIENT)
Dept: NEUROLOGY | Facility: CLINIC | Age: 26
End: 2021-10-07

## 2021-10-07 DIAGNOSIS — G44.011 INTRACTABLE EPISODIC CLUSTER HEADACHE: Primary | ICD-10-CM

## 2021-10-07 DIAGNOSIS — H57.89 RED EYE: ICD-10-CM

## 2021-10-08 NOTE — TELEPHONE ENCOUNTER
From: Nancy Earing Degroote  To: Murry Rinne, MD  Sent: 10/7/2021 7:37 PM CDT  Subject: Follow up question    I still have frequent headaches, I believe it's time for a scan.  Can I get a referral for an mri scan

## 2021-10-11 ENCOUNTER — TELEPHONE (OUTPATIENT)
Dept: INTERNAL MEDICINE CLINIC | Facility: CLINIC | Age: 26
End: 2021-10-11

## 2021-10-11 NOTE — TELEPHONE ENCOUNTER
LOV with Dr Stella Lerma was 9/21/21.   Coppied notes:    A/P:   Headache disorder: likely tension type headache  Anxiety      Plan:  Headache journal advised  Try depakote low dose  Stay hydrated  PCP, Psychiatrist to follow  See orders and medications filed with

## 2021-10-11 NOTE — TELEPHONE ENCOUNTER
Pt also sent [facility] today. I still have frequent headaches and they're becoming more painful. I have also seemed to have developed glossy red eyes following head pain.  I've spoken to a neurologist and he told me to contact my PCP

## 2021-10-11 NOTE — TELEPHONE ENCOUNTER
MRI brain ordered. Pt states headache not improving, please be advised that your insurance may deny MRI since you had a normal MRI last year. Please follow up in clinic to discuss headache management since it is not improving.   Regarding your eye symptoms

## 2021-10-27 ENCOUNTER — TELEPHONE (OUTPATIENT)
Dept: INTERNAL MEDICINE CLINIC | Facility: CLINIC | Age: 26
End: 2021-10-27

## 2021-10-27 NOTE — TELEPHONE ENCOUNTER
Pt coming in for his 2nd Hep B shot, 1st one was 9/28. Please advise.  Pt scheduled on below    Future Appointments   Date Time Provider Bibi Rabago   10/28/2021 11:00 AM [facility] NURSE [facility]

## 2021-10-28 ENCOUNTER — NURSE ONLY (OUTPATIENT)
Dept: INTERNAL MEDICINE CLINIC | Facility: CLINIC | Age: 26
End: 2021-10-28
Payer: COMMERCIAL

## 2021-10-28 PROCEDURE — 90746 HEPB VACCINE 3 DOSE ADULT IM: CPT | Performed by: INTERNAL MEDICINE

## 2021-10-28 PROCEDURE — 90471 IMMUNIZATION ADMIN: CPT | Performed by: INTERNAL MEDICINE

## 2021-11-16 DIAGNOSIS — G44.011 INTRACTABLE EPISODIC CLUSTER HEADACHE: Primary | ICD-10-CM

## 2021-11-16 RX ORDER — DIVALPROEX SODIUM 250 MG/1
250 TABLET, EXTENDED RELEASE ORAL DAILY
Qty: 30 TABLET | Refills: 2 | Status: SHIPPED | OUTPATIENT
Start: 2021-11-16

## 2021-11-16 NOTE — TELEPHONE ENCOUNTER
Medication: divalproex Sodium  MG Oral Tablet 24 Hr       Date of last refill: 09/21/2021 (#30/1)  [place]     Last office visit: 09/21/2021  Due back to clinic per last office note:  Around 12/21/2021  Date ne

## 2021-11-17 RX ORDER — ALPRAZOLAM 0.5 MG/1
0.5 TABLET ORAL DAILY PRN
Qty: 20 TABLET | Refills: 0 | Status: SHIPPED | OUTPATIENT
Start: 2021-11-17 | End: 2021-12-27

## 2021-12-21 ENCOUNTER — OFFICE VISIT (OUTPATIENT)
Dept: OTHER | Facility: HOSPITAL | Age: 26
End: 2021-12-21
Attending: ORTHOPAEDIC SURGERY

## 2021-12-21 DIAGNOSIS — Z00.00 ROUTINE GENERAL MEDICAL EXAMINATION AT A HEALTH CARE FACILITY: Primary | ICD-10-CM

## 2021-12-21 PROCEDURE — 86480 TB TEST CELL IMMUN MEASURE: CPT

## 2022-02-16 RX ORDER — DIVALPROEX SODIUM 250 MG/1
TABLET, EXTENDED RELEASE ORAL
Qty: 30 TABLET | Refills: 0 | Status: SHIPPED | OUTPATIENT
Start: 2022-02-16 | End: 2022-03-21

## 2022-03-23 RX ORDER — DIVALPROEX SODIUM 250 MG/1
250 TABLET, EXTENDED RELEASE ORAL DAILY
Qty: 30 TABLET | Refills: 0 | Status: SHIPPED | OUTPATIENT
Start: 2022-03-23

## 2022-04-13 NOTE — TELEPHONE ENCOUNTER
Bed: [facility]  Expected date:   Expected time:   Means of arrival:   Comments:   LOV   9-9-21    9-24-21    #20 pended

## 2022-04-25 RX ORDER — DIVALPROEX SODIUM 250 MG/1
250 TABLET, EXTENDED RELEASE ORAL DAILY
Qty: 30 TABLET | Refills: 0 | Status: SHIPPED | OUTPATIENT
Start: 2022-04-25

## 2022-05-06 ENCOUNTER — PATIENT MESSAGE (OUTPATIENT)
Dept: INTERNAL MEDICINE CLINIC | Facility: CLINIC | Age: 27
End: 2022-05-06

## 2022-05-18 ENCOUNTER — OFFICE VISIT (OUTPATIENT)
Dept: INTERNAL MEDICINE CLINIC | Facility: CLINIC | Age: 27
End: 2022-05-18
Payer: COMMERCIAL

## 2022-05-18 VITALS
DIASTOLIC BLOOD PRESSURE: 60 MMHG | OXYGEN SATURATION: 98 % | SYSTOLIC BLOOD PRESSURE: 128 MMHG | TEMPERATURE: 98 F | HEIGHT: 69 IN | RESPIRATION RATE: 16 BRPM | HEART RATE: 68 BPM | BODY MASS INDEX: 24.73 KG/M2 | WEIGHT: 167 LBS

## 2022-05-18 DIAGNOSIS — Z13.29 SCREENING FOR THYROID DISORDER: ICD-10-CM

## 2022-05-18 DIAGNOSIS — Z13.0 SCREENING FOR BLOOD DISEASE: ICD-10-CM

## 2022-05-18 DIAGNOSIS — Z13.228 SCREENING FOR METABOLIC DISORDER: ICD-10-CM

## 2022-05-18 DIAGNOSIS — F41.9 ANXIETY: ICD-10-CM

## 2022-05-18 DIAGNOSIS — N39.44 NOCTURNAL ENURESIS: Primary | ICD-10-CM

## 2022-05-18 DIAGNOSIS — Z00.00 LABORATORY EXAMINATION ORDERED AS PART OF A COMPLETE PHYSICAL EXAMINATION: ICD-10-CM

## 2022-05-18 DIAGNOSIS — Z13.220 SCREENING FOR LIPID DISORDERS: ICD-10-CM

## 2022-05-18 LAB
APPEARANCE: CLEAR
BILIRUBIN: NEGATIVE
GLUCOSE (URINE DIPSTICK): NEGATIVE MG/DL
KETONES (URINE DIPSTICK): NEGATIVE MG/DL
LEUKOCYTES: NEGATIVE
MULTISTIX LOT#: NORMAL NUMERIC
NITRITE, URINE: NEGATIVE
OCCULT BLOOD: NEGATIVE
PH, URINE: 6 (ref 4.5–8)
PROTEIN (URINE DIPSTICK): NEGATIVE MG/DL
SPECIFIC GRAVITY: >=1.03 (ref 1–1.03)
URINE-COLOR: YELLOW
UROBILINOGEN,SEMI-QN: 0.2 MG/DL (ref 0–1.9)

## 2022-05-18 PROCEDURE — 3078F DIAST BP <80 MM HG: CPT | Performed by: INTERNAL MEDICINE

## 2022-05-18 PROCEDURE — 81003 URINALYSIS AUTO W/O SCOPE: CPT | Performed by: INTERNAL MEDICINE

## 2022-05-18 PROCEDURE — 3074F SYST BP LT 130 MM HG: CPT | Performed by: INTERNAL MEDICINE

## 2022-05-18 PROCEDURE — 99214 OFFICE O/P EST MOD 30 MIN: CPT | Performed by: INTERNAL MEDICINE

## 2022-05-18 PROCEDURE — 3008F BODY MASS INDEX DOCD: CPT | Performed by: INTERNAL MEDICINE

## 2022-05-18 NOTE — TELEPHONE ENCOUNTER
Please call the patient and cancel appointment. I will call him later today. Obtain a phone number where he can be reached.

## 2022-05-18 NOTE — TELEPHONE ENCOUNTER
Patient was contacted- he would like to continue with visit as scheduled. Patient states he does have insurance at this time and would like to see AD.

## 2022-05-21 DIAGNOSIS — G44.011 INTRACTABLE EPISODIC CLUSTER HEADACHE: ICD-10-CM

## 2022-05-24 RX ORDER — DIVALPROEX SODIUM 250 MG/1
250 TABLET, EXTENDED RELEASE ORAL DAILY
Qty: 30 TABLET | Refills: 0 | Status: SHIPPED | OUTPATIENT
Start: 2022-05-24 | End: 2022-05-27

## 2022-05-27 ENCOUNTER — OFFICE VISIT (OUTPATIENT)
Dept: NEUROLOGY | Facility: CLINIC | Age: 27
End: 2022-05-27
Payer: COMMERCIAL

## 2022-05-27 VITALS
DIASTOLIC BLOOD PRESSURE: 60 MMHG | WEIGHT: 168 LBS | SYSTOLIC BLOOD PRESSURE: 122 MMHG | BODY MASS INDEX: 25 KG/M2 | RESPIRATION RATE: 16 BRPM | HEART RATE: 86 BPM

## 2022-05-27 DIAGNOSIS — G44.011 INTRACTABLE EPISODIC CLUSTER HEADACHE: ICD-10-CM

## 2022-05-27 PROCEDURE — 3078F DIAST BP <80 MM HG: CPT | Performed by: OTHER

## 2022-05-27 PROCEDURE — 3074F SYST BP LT 130 MM HG: CPT | Performed by: OTHER

## 2022-05-27 PROCEDURE — 99214 OFFICE O/P EST MOD 30 MIN: CPT | Performed by: OTHER

## 2022-05-27 RX ORDER — DIVALPROEX SODIUM 250 MG/1
250 TABLET, EXTENDED RELEASE ORAL DAILY
Qty: 90 TABLET | Refills: 3 | Status: SHIPPED | OUTPATIENT
Start: 2022-05-27

## 2022-06-10 ENCOUNTER — LAB ENCOUNTER (OUTPATIENT)
Dept: LAB | Age: 27
End: 2022-06-10
Attending: INTERNAL MEDICINE
Payer: COMMERCIAL

## 2022-06-10 DIAGNOSIS — Z13.228 SCREENING FOR METABOLIC DISORDER: ICD-10-CM

## 2022-06-10 DIAGNOSIS — Z00.00 LABORATORY EXAMINATION ORDERED AS PART OF A COMPLETE PHYSICAL EXAMINATION: ICD-10-CM

## 2022-06-10 DIAGNOSIS — Z13.0 SCREENING FOR BLOOD DISEASE: ICD-10-CM

## 2022-06-10 DIAGNOSIS — Z13.29 SCREENING FOR THYROID DISORDER: ICD-10-CM

## 2022-06-10 DIAGNOSIS — Z13.220 SCREENING FOR LIPID DISORDERS: ICD-10-CM

## 2022-06-10 LAB
ALBUMIN SERPL-MCNC: 4.3 G/DL (ref 3.4–5)
ALBUMIN/GLOB SERPL: 1.2 {RATIO} (ref 1–2)
ALP LIVER SERPL-CCNC: 38 U/L
ALT SERPL-CCNC: 66 U/L
ANION GAP SERPL CALC-SCNC: 5 MMOL/L (ref 0–18)
AST SERPL-CCNC: 31 U/L (ref 15–37)
BASOPHILS # BLD AUTO: 0.03 X10(3) UL (ref 0–0.2)
BASOPHILS NFR BLD AUTO: 0.4 %
BILIRUB SERPL-MCNC: 0.4 MG/DL (ref 0.1–2)
BUN BLD-MCNC: 12 MG/DL (ref 7–18)
CALCIUM BLD-MCNC: 9.4 MG/DL (ref 8.5–10.1)
CHLORIDE SERPL-SCNC: 104 MMOL/L (ref 98–112)
CHOLEST SERPL-MCNC: 146 MG/DL (ref ?–200)
CO2 SERPL-SCNC: 29 MMOL/L (ref 21–32)
CREAT BLD-MCNC: 0.96 MG/DL
EOSINOPHIL # BLD AUTO: 0.14 X10(3) UL (ref 0–0.7)
EOSINOPHIL NFR BLD AUTO: 1.8 %
ERYTHROCYTE [DISTWIDTH] IN BLOOD BY AUTOMATED COUNT: 11.9 %
FASTING PATIENT LIPID ANSWER: YES
FASTING STATUS PATIENT QL REPORTED: YES
GLOBULIN PLAS-MCNC: 3.5 G/DL (ref 2.8–4.4)
GLUCOSE BLD-MCNC: 90 MG/DL (ref 70–99)
HCT VFR BLD AUTO: 47.2 %
HDLC SERPL-MCNC: 52 MG/DL (ref 40–59)
HGB BLD-MCNC: 16.1 G/DL
IMM GRANULOCYTES # BLD AUTO: 0.04 X10(3) UL (ref 0–1)
IMM GRANULOCYTES NFR BLD: 0.5 %
LDLC SERPL CALC-MCNC: 79 MG/DL (ref ?–100)
LYMPHOCYTES # BLD AUTO: 2.63 X10(3) UL (ref 1–4)
LYMPHOCYTES NFR BLD AUTO: 33.4 %
MCH RBC QN AUTO: 31.1 PG (ref 26–34)
MCHC RBC AUTO-ENTMCNC: 34.1 G/DL (ref 31–37)
MCV RBC AUTO: 91.1 FL
MONOCYTES # BLD AUTO: 0.77 X10(3) UL (ref 0.1–1)
MONOCYTES NFR BLD AUTO: 9.8 %
NEUTROPHILS # BLD AUTO: 4.27 X10 (3) UL (ref 1.5–7.7)
NEUTROPHILS # BLD AUTO: 4.27 X10(3) UL (ref 1.5–7.7)
NEUTROPHILS NFR BLD AUTO: 54.1 %
NONHDLC SERPL-MCNC: 94 MG/DL (ref ?–130)
OSMOLALITY SERPL CALC.SUM OF ELEC: 285 MOSM/KG (ref 275–295)
PLATELET # BLD AUTO: 215 10(3)UL (ref 150–450)
POTASSIUM SERPL-SCNC: 4 MMOL/L (ref 3.5–5.1)
PROT SERPL-MCNC: 7.8 G/DL (ref 6.4–8.2)
RBC # BLD AUTO: 5.18 X10(6)UL
SODIUM SERPL-SCNC: 138 MMOL/L (ref 136–145)
TRIGL SERPL-MCNC: 74 MG/DL (ref 30–149)
TSI SER-ACNC: 1.53 MIU/ML (ref 0.36–3.74)
VLDLC SERPL CALC-MCNC: 12 MG/DL (ref 0–30)
WBC # BLD AUTO: 7.9 X10(3) UL (ref 4–11)

## 2022-06-10 PROCEDURE — 36415 COLL VENOUS BLD VENIPUNCTURE: CPT

## 2022-06-10 PROCEDURE — 80061 LIPID PANEL: CPT

## 2022-06-10 PROCEDURE — 80053 COMPREHEN METABOLIC PANEL: CPT

## 2022-06-10 PROCEDURE — 84443 ASSAY THYROID STIM HORMONE: CPT

## 2022-06-10 PROCEDURE — 85025 COMPLETE CBC W/AUTO DIFF WBC: CPT

## 2022-06-24 DIAGNOSIS — G44.011 INTRACTABLE EPISODIC CLUSTER HEADACHE: ICD-10-CM

## 2022-06-24 RX ORDER — DIVALPROEX SODIUM 250 MG/1
250 TABLET, EXTENDED RELEASE ORAL DAILY
Qty: 90 TABLET | Refills: 3 | OUTPATIENT
Start: 2022-06-24

## 2022-09-19 DIAGNOSIS — G44.011 INTRACTABLE EPISODIC CLUSTER HEADACHE: ICD-10-CM

## 2022-09-19 RX ORDER — DIVALPROEX SODIUM 250 MG/1
250 TABLET, EXTENDED RELEASE ORAL DAILY
Qty: 90 TABLET | Refills: 3 | OUTPATIENT
Start: 2022-09-19

## 2022-11-28 ENCOUNTER — PATIENT MESSAGE (OUTPATIENT)
Dept: INTERNAL MEDICINE CLINIC | Facility: CLINIC | Age: 27
End: 2022-11-28

## 2022-11-28 DIAGNOSIS — Z11.3 ROUTINE SCREENING FOR STI (SEXUALLY TRANSMITTED INFECTION): Primary | ICD-10-CM

## 2022-11-29 NOTE — TELEPHONE ENCOUNTER
From: [facility] [place]  To: Leanna Quispe MD  Sent: 11/28/2022 3:05 PM CST  Subject: Need a blood and urine test    Need blood and urine test checking for sexually transmitted diseases - no symptoms.  Never had a test done and would like one

## 2022-12-01 ENCOUNTER — LAB ENCOUNTER (OUTPATIENT)
Dept: LAB | Age: 27
End: 2022-12-01
Attending: INTERNAL MEDICINE
Payer: COMMERCIAL

## 2022-12-01 DIAGNOSIS — Z11.3 ROUTINE SCREENING FOR STI (SEXUALLY TRANSMITTED INFECTION): ICD-10-CM

## 2022-12-01 DIAGNOSIS — R74.01 ELEVATED ALT MEASUREMENT: ICD-10-CM

## 2022-12-01 LAB
ALBUMIN SERPL-MCNC: 4.2 G/DL (ref 3.4–5)
ALBUMIN/GLOB SERPL: 1.1 {RATIO} (ref 1–2)
ALP LIVER SERPL-CCNC: 39 U/L
ALT SERPL-CCNC: 33 U/L
ANION GAP SERPL CALC-SCNC: 0 MMOL/L (ref 0–18)
AST SERPL-CCNC: 28 U/L (ref 15–37)
BILIRUB SERPL-MCNC: 0.3 MG/DL (ref 0.1–2)
BUN BLD-MCNC: 12 MG/DL (ref 7–18)
CALCIUM BLD-MCNC: 9.3 MG/DL (ref 8.5–10.1)
CHLORIDE SERPL-SCNC: 107 MMOL/L (ref 98–112)
CO2 SERPL-SCNC: 29 MMOL/L (ref 21–32)
CREAT BLD-MCNC: 0.99 MG/DL
FASTING STATUS PATIENT QL REPORTED: YES
GFR SERPLBLD BASED ON 1.73 SQ M-ARVRAT: 108 ML/MIN/1.73M2 (ref 60–?)
GLOBULIN PLAS-MCNC: 3.8 G/DL (ref 2.8–4.4)
GLUCOSE BLD-MCNC: 97 MG/DL (ref 70–99)
OSMOLALITY SERPL CALC.SUM OF ELEC: 282 MOSM/KG (ref 275–295)
POTASSIUM SERPL-SCNC: 4.1 MMOL/L (ref 3.5–5.1)
PROT SERPL-MCNC: 8 G/DL (ref 6.4–8.2)
SODIUM SERPL-SCNC: 136 MMOL/L (ref 136–145)
T PALLIDUM AB SER QL IA: NONREACTIVE

## 2022-12-01 PROCEDURE — 87389 HIV-1 AG W/HIV-1&-2 AB AG IA: CPT | Performed by: INTERNAL MEDICINE

## 2022-12-01 PROCEDURE — 87491 CHLMYD TRACH DNA AMP PROBE: CPT | Performed by: INTERNAL MEDICINE

## 2022-12-01 PROCEDURE — 87591 N.GONORRHOEAE DNA AMP PROB: CPT | Performed by: INTERNAL MEDICINE

## 2022-12-01 PROCEDURE — 80053 COMPREHEN METABOLIC PANEL: CPT | Performed by: INTERNAL MEDICINE

## 2022-12-01 PROCEDURE — 86780 TREPONEMA PALLIDUM: CPT | Performed by: INTERNAL MEDICINE

## 2022-12-02 LAB
C TRACH DNA SPEC QL NAA+PROBE: NEGATIVE
N GONORRHOEA DNA SPEC QL NAA+PROBE: NEGATIVE

## 2023-02-03 NOTE — TELEPHONE ENCOUNTER
Please call to offer appt for Hep B #1; it will be a 3 dose series and is advised per AD. Thanks.      Gonzalo Cline MD   9/21/2021 12:38 PM CDT         Hepatitis B immunity not confirmed; offer immunization   Rubella immunity confirmed PACU

## 2023-03-27 ENCOUNTER — TELEMEDICINE (OUTPATIENT)
Dept: INTERNAL MEDICINE CLINIC | Facility: CLINIC | Age: 28
End: 2023-03-27
Payer: COMMERCIAL

## 2023-03-27 DIAGNOSIS — J01.90 ACUTE RHINOSINUSITIS: Primary | ICD-10-CM

## 2023-03-27 PROCEDURE — 99213 OFFICE O/P EST LOW 20 MIN: CPT | Performed by: INTERNAL MEDICINE

## 2023-03-27 RX ORDER — AMOXICILLIN AND CLAVULANATE POTASSIUM 875; 125 MG/1; MG/1
1 TABLET, FILM COATED ORAL 2 TIMES DAILY
Qty: 20 TABLET | Refills: 0 | Status: SHIPPED | OUTPATIENT
Start: 2023-03-27 | End: 2023-04-06

## 2023-06-21 DIAGNOSIS — G44.011 INTRACTABLE EPISODIC CLUSTER HEADACHE: ICD-10-CM

## 2023-06-21 RX ORDER — DIVALPROEX SODIUM 250 MG/1
250 TABLET, EXTENDED RELEASE ORAL DAILY
Qty: 30 TABLET | Refills: 0 | Status: SHIPPED | OUTPATIENT
Start: 2023-06-21

## 2023-07-26 ENCOUNTER — TELEPHONE (OUTPATIENT)
Dept: NEUROLOGY | Facility: CLINIC | Age: 28
End: 2023-07-26

## 2023-07-26 NOTE — TELEPHONE ENCOUNTER
Carosn arrived to his physical therapy appointment, check in and then decided he did not want to be seen today because he was having back pain. He thought he had to have a re-evaluation today and stated that if he knew he had to do any exercise today he would have cancelled. His appointment was cancelled and no treatment was rendered.     Jose Clark, PT    PT would like to request appt scheduled 7/28/2023 at 9:30am  be changed to virtual due to work conflict.  Please advise if appt could be changed to virtual.

## 2023-07-26 NOTE — TELEPHONE ENCOUNTER
Appt changed to video visit. Pt notified stated he is aware on how to check in for visit on his mychart.

## 2023-07-28 ENCOUNTER — TELEMEDICINE (OUTPATIENT)
Dept: NEUROLOGY | Facility: CLINIC | Age: 28
End: 2023-07-28
Payer: COMMERCIAL

## 2023-07-28 DIAGNOSIS — G44.011 INTRACTABLE EPISODIC CLUSTER HEADACHE: ICD-10-CM

## 2023-07-28 PROCEDURE — 99212 OFFICE O/P EST SF 10 MIN: CPT | Performed by: OTHER

## 2023-07-28 RX ORDER — DIVALPROEX SODIUM 250 MG/1
250 TABLET, EXTENDED RELEASE ORAL DAILY
Qty: 90 TABLET | Refills: 3 | Status: SHIPPED | OUTPATIENT
Start: 2023-07-28

## 2023-07-28 NOTE — PROGRESS NOTES
Video Visit Note     Date of service: 7/28/2023    Lluvia Hendricks verbally consents to a Video Visit service on 7/28/2023. Patient understands and accepts financial responsibility for any deductible, co-insurance and/or co-pays associated with this service. This visit is conducted using Telemedicine with live, interactive video and audio. Patient is in the [place]. Duration of the service: 7minutes  Assessment:  Headache disorder: likely tension type headache  Anxiety      Plan:  cont depakote low dose, ok to wean off in the future  PCP, Psychiatrist to follow  See orders and medications filed with this encounter. The patient indicates understanding of these issues and agrees with the plan. Discussed with patient in detail regarding the adverse and side effects of the medication. RTC 9 months  Pt should go ER for any new or worsening symptoms and contact office     Subjective:   Summary of topics discussed:    Patient here for a follow-up visit; headache is well controlled, tolerating low dose depakote with no side effects reported. Sleep is good. He is asking if he can wean off depakote. Felt it likely due to increased stress. Denies associated light or noise sensitivity. MRI brain was normal.  MRI c spine unremarkable. History/Other:    REVIEW OF SYSTEMS:  A 10-point system was reviewed. Pertinent positives and negatives are noted in HPI. Current Outpatient Medications:     sertraline (ZOLOFT) 50 MG Oral Tab, Take 1 1/2 (75mg) by mouth daily x7 days and then take 2 tablets (100mg) by mouth daily thereafter., Disp: 60 tablet, Rfl: 0    clonazePAM (KLONOPIN) 0.5 MG Oral Tab, Take 1 tablet (0.5 mg total) by mouth nightly as needed for Anxiety. , Disp: 30 tablet, Rfl: 0    divalproex  MG Oral Tablet 24 Hr, Take 1 tablet (250 mg total) by mouth daily.  OFFICE APPOINTMENT REQUIRED FOR REFILLS, Disp: 30 tablet, Rfl: 0    hydrocortisone (ANUSOL-HC) 2.5 % External Cream, Place 1 Application rectally 2 (two) times daily as needed for Hemorrhoids. , Disp: 1 each, Rfl: 0    Fluticasone Propionate 50 MCG/ACT Nasal Suspension, 2 sprays by Each Nare route daily. , Disp: 16 g, Rfl: 0    melatonin 1 MG Oral Tab, Take 2 mg by mouth nightly., Disp: , Rfl:     Multiple Vitamins-Minerals (MULTIVITAMIN ADULT OR), Take by mouth., Disp: , Rfl:   Allergies:  No Known Allergies  Past Medical History:   Diagnosis Date    Abdominal pain March 20th    Anxiety     Belching March 1st    Bloating March 1st    Chronic cough March 1st    Temporarily after eating    Decorative tattoo 2015    Fatigue Mid March    Frequent use of laxatives Only in February    Headache disorder March 15th    Heartburn March 1st    Loss of appetite Beginning of March    Stress Feb 1st    Uncomfortable fullness after meals March 1st    Weight loss March 20th     Past Surgical History:   Procedure Laterality Date    OTHER SURGICAL HISTORY      wisdom teeth extracted    TONSILLECTOMY       Social History:  Social History    Tobacco Use      Smoking status: Never      Smokeless tobacco: Never    Alcohol use: Not Currently      Comment: Social-rare    Family History   Problem Relation Age of Onset    Asthma Brother     ADHD Brother     Anxiety Brother     Thyroid Disorder Mother     Anxiety Mother     Other (Other) Mother         xanax    Thyroid Disorder Maternal Grandmother     Anxiety Sister       Objective:    Physical Examination:  Deferred due to video visit   Speaking in full sentences, no increased work of breathing. symmetric face    Imaging Reviewed on 7/28/2023  Notes Reviewed on 7/28/2023  Labs Reviewed  on 7/28/2023    Please note that this visit was completed using two-way, real-time interactive audio and/or video communication.   This has been done in good lolly to provide continuity of care in the best interest of the provider-patient relationship, due to the ongoing public health crisis/national emergency and because of restrictions of visitation. There are limitations of this visit as no physical exam could be performed. Every conscious effort was taken to allow for sufficient and adequate time. This billing was spent on reviewing labs, medications, radiology tests and decision making. Appropriate medical decision-making and tests are ordered as detailed in the plan of care above.     Jesus Manuel Concepcion MD   Neurology  [facility]  7/28/2023, 9:35 AM  Consultation Report: being sent/fax/route to requesting provider  CC: Amrita Vivar MD

## 2023-08-03 ENCOUNTER — TELEPHONE (OUTPATIENT)
Dept: INTERNAL MEDICINE CLINIC | Facility: CLINIC | Age: 28
End: 2023-08-03

## 2023-08-15 ENCOUNTER — TELEPHONE (OUTPATIENT)
Dept: INTERNAL MEDICINE CLINIC | Facility: CLINIC | Age: 28
End: 2023-08-15

## 2023-08-16 NOTE — TELEPHONE ENCOUNTER
From: Mark Hendricks  To: Alise Ramon MD  Sent: 5/6/2022 7:53 AM CDT  Subject: New medical concern    For a month and a half I've been experiencing nocturnal enuresis. It occurs every single night. There isn't a ton of urine found but enough to notice. No pain anywhere no other symptoms.

## 2023-08-22 ENCOUNTER — OFFICE VISIT (OUTPATIENT)
Dept: INTERNAL MEDICINE CLINIC | Facility: CLINIC | Age: 28
End: 2023-08-22
Payer: COMMERCIAL

## 2023-08-22 VITALS
HEIGHT: 69 IN | OXYGEN SATURATION: 98 % | RESPIRATION RATE: 16 BRPM | HEART RATE: 57 BPM | SYSTOLIC BLOOD PRESSURE: 124 MMHG | BODY MASS INDEX: 24.88 KG/M2 | WEIGHT: 168 LBS | DIASTOLIC BLOOD PRESSURE: 84 MMHG

## 2023-08-22 DIAGNOSIS — Z00.00 ROUTINE GENERAL MEDICAL EXAMINATION AT A HEALTH CARE FACILITY: Primary | ICD-10-CM

## 2023-08-22 DIAGNOSIS — F41.9 ANXIETY: ICD-10-CM

## 2023-08-22 DIAGNOSIS — G44.011 INTRACTABLE EPISODIC CLUSTER HEADACHE: ICD-10-CM

## 2023-08-22 PROBLEM — F41.0 PANIC ANXIETY SYNDROME: Status: ACTIVE | Noted: 2023-08-22

## 2023-08-22 PROCEDURE — 99395 PREV VISIT EST AGE 18-39: CPT | Performed by: INTERNAL MEDICINE

## 2023-08-22 PROCEDURE — 3079F DIAST BP 80-89 MM HG: CPT | Performed by: INTERNAL MEDICINE

## 2023-08-22 PROCEDURE — 3074F SYST BP LT 130 MM HG: CPT | Performed by: INTERNAL MEDICINE

## 2023-08-22 PROCEDURE — 3008F BODY MASS INDEX DOCD: CPT | Performed by: INTERNAL MEDICINE

## 2023-09-01 ENCOUNTER — PATIENT MESSAGE (OUTPATIENT)
Dept: INTERNAL MEDICINE CLINIC | Facility: CLINIC | Age: 28
End: 2023-09-01

## 2023-09-01 DIAGNOSIS — Z00.00 LABORATORY EXAMINATION ORDERED AS PART OF A COMPLETE PHYSICAL EXAMINATION: ICD-10-CM

## 2023-09-01 DIAGNOSIS — Z00.00 ROUTINE GENERAL MEDICAL EXAMINATION AT A HEALTH CARE FACILITY: Primary | ICD-10-CM

## 2023-09-05 NOTE — TELEPHONE ENCOUNTER
From: Violet Hendricks  To: Taqueria Orozco MD  Sent: 9/1/2023 2:49 PM CDT  Subject: Referral for lab after physical     I know I have a physical scheduled however it was completed last visit and will need a referral for the lab part.  I would prefer a blood and urine test to make sure everything is okay

## 2023-09-05 NOTE — TELEPHONE ENCOUNTER
Please advise. Patient requesting physical labs. Last appointment: 8/22/23  Upcomming appointment: 10/3/23    Labs pended. Please review/approve if appropriate.

## 2023-09-07 ENCOUNTER — LAB ENCOUNTER (OUTPATIENT)
Dept: LAB | Age: 28
End: 2023-09-07
Attending: INTERNAL MEDICINE
Payer: COMMERCIAL

## 2023-09-07 DIAGNOSIS — Z00.00 LABORATORY EXAMINATION ORDERED AS PART OF A COMPLETE PHYSICAL EXAMINATION: ICD-10-CM

## 2023-09-07 DIAGNOSIS — R74.8 ABNORMAL LIVER ENZYMES: ICD-10-CM

## 2023-09-07 LAB
ALBUMIN SERPL-MCNC: 4 G/DL (ref 3.4–5)
ALBUMIN/GLOB SERPL: 1 {RATIO} (ref 1–2)
ALP LIVER SERPL-CCNC: 40 U/L
ALT SERPL-CCNC: 24 U/L
ANION GAP SERPL CALC-SCNC: 5 MMOL/L (ref 0–18)
AST SERPL-CCNC: 11 U/L (ref 15–37)
BASOPHILS # BLD AUTO: 0.02 X10(3) UL (ref 0–0.2)
BASOPHILS NFR BLD AUTO: 0.2 %
BILIRUB SERPL-MCNC: 0.4 MG/DL (ref 0.1–2)
BILIRUB UR QL STRIP.AUTO: NEGATIVE
BUN BLD-MCNC: 15 MG/DL (ref 7–18)
CALCIUM BLD-MCNC: 9.1 MG/DL (ref 8.5–10.1)
CHLORIDE SERPL-SCNC: 106 MMOL/L (ref 98–112)
CHOLEST SERPL-MCNC: 120 MG/DL (ref ?–200)
CLARITY UR REFRACT.AUTO: CLEAR
CO2 SERPL-SCNC: 27 MMOL/L (ref 21–32)
CREAT BLD-MCNC: 0.83 MG/DL
EGFRCR SERPLBLD CKD-EPI 2021: 123 ML/MIN/1.73M2 (ref 60–?)
EOSINOPHIL # BLD AUTO: 0.19 X10(3) UL (ref 0–0.7)
EOSINOPHIL NFR BLD AUTO: 2.3 %
ERYTHROCYTE [DISTWIDTH] IN BLOOD BY AUTOMATED COUNT: 11.8 %
FASTING PATIENT LIPID ANSWER: YES
FASTING STATUS PATIENT QL REPORTED: YES
GLOBULIN PLAS-MCNC: 4 G/DL (ref 2.8–4.4)
GLUCOSE BLD-MCNC: 87 MG/DL (ref 70–99)
GLUCOSE UR STRIP.AUTO-MCNC: NORMAL MG/DL
HCT VFR BLD AUTO: 43.2 %
HDLC SERPL-MCNC: 34 MG/DL (ref 40–59)
HGB BLD-MCNC: 15.1 G/DL
IMM GRANULOCYTES # BLD AUTO: 0.02 X10(3) UL (ref 0–1)
IMM GRANULOCYTES NFR BLD: 0.2 %
KETONES UR STRIP.AUTO-MCNC: NEGATIVE MG/DL
LDLC SERPL CALC-MCNC: 69 MG/DL (ref ?–100)
LEUKOCYTE ESTERASE UR QL STRIP.AUTO: NEGATIVE
LYMPHOCYTES # BLD AUTO: 2.37 X10(3) UL (ref 1–4)
LYMPHOCYTES NFR BLD AUTO: 29 %
MCH RBC QN AUTO: 30.9 PG (ref 26–34)
MCHC RBC AUTO-ENTMCNC: 35 G/DL (ref 31–37)
MCV RBC AUTO: 88.3 FL
MONOCYTES # BLD AUTO: 0.8 X10(3) UL (ref 0.1–1)
MONOCYTES NFR BLD AUTO: 9.8 %
NEUTROPHILS # BLD AUTO: 4.77 X10 (3) UL (ref 1.5–7.7)
NEUTROPHILS # BLD AUTO: 4.77 X10(3) UL (ref 1.5–7.7)
NEUTROPHILS NFR BLD AUTO: 58.5 %
NITRITE UR QL STRIP.AUTO: NEGATIVE
NONHDLC SERPL-MCNC: 86 MG/DL (ref ?–130)
OSMOLALITY SERPL CALC.SUM OF ELEC: 286 MOSM/KG (ref 275–295)
PH UR STRIP.AUTO: 7.5 [PH] (ref 5–8)
PLATELET # BLD AUTO: 187 10(3)UL (ref 150–450)
POTASSIUM SERPL-SCNC: 4.4 MMOL/L (ref 3.5–5.1)
PROT SERPL-MCNC: 8 G/DL (ref 6.4–8.2)
PROT UR STRIP.AUTO-MCNC: NEGATIVE MG/DL
RBC # BLD AUTO: 4.89 X10(6)UL
RBC UR QL AUTO: NEGATIVE
SODIUM SERPL-SCNC: 138 MMOL/L (ref 136–145)
SP GR UR STRIP.AUTO: 1.01 (ref 1–1.03)
TRIGL SERPL-MCNC: 88 MG/DL (ref 30–149)
TSI SER-ACNC: 1.39 MIU/ML (ref 0.36–3.74)
UROBILINOGEN UR STRIP.AUTO-MCNC: NORMAL MG/DL
VLDLC SERPL CALC-MCNC: 13 MG/DL (ref 0–30)
WBC # BLD AUTO: 8.2 X10(3) UL (ref 4–11)

## 2023-09-07 PROCEDURE — 85025 COMPLETE CBC W/AUTO DIFF WBC: CPT

## 2023-09-07 PROCEDURE — 80053 COMPREHEN METABOLIC PANEL: CPT

## 2023-09-07 PROCEDURE — 84443 ASSAY THYROID STIM HORMONE: CPT

## 2023-09-07 PROCEDURE — 81003 URINALYSIS AUTO W/O SCOPE: CPT

## 2023-09-07 PROCEDURE — 36415 COLL VENOUS BLD VENIPUNCTURE: CPT

## 2023-09-07 PROCEDURE — 80061 LIPID PANEL: CPT

## 2023-10-07 ENCOUNTER — HOSPITAL ENCOUNTER (EMERGENCY)
Facility: HOSPITAL | Age: 28
Discharge: HOME | End: 2023-10-07
Attending: EMERGENCY MEDICINE

## 2023-10-07 VITALS
RESPIRATION RATE: 18 BRPM | TEMPERATURE: 98 F | OXYGEN SATURATION: 99 % | DIASTOLIC BLOOD PRESSURE: 91 MMHG | SYSTOLIC BLOOD PRESSURE: 138 MMHG | HEART RATE: 66 BPM

## 2023-10-07 DIAGNOSIS — R00.2 PALPITATIONS: Primary | ICD-10-CM

## 2023-10-07 LAB
ALBUMIN SERPL-MCNC: 4 G/DL (ref 3.4–5)
ALBUMIN/GLOB SERPL: 1.1 {RATIO} (ref 1–2)
ALP LIVER SERPL-CCNC: 40 U/L
ALT SERPL-CCNC: 33 U/L
ANION GAP SERPL CALC-SCNC: 5 MMOL/L (ref 0–18)
AST SERPL-CCNC: 27 U/L (ref 15–37)
BASOPHILS # BLD AUTO: 0.03 X10(3) UL (ref 0–0.2)
BASOPHILS NFR BLD AUTO: 0.3 %
BILIRUB SERPL-MCNC: 0.3 MG/DL (ref 0.1–2)
BUN BLD-MCNC: 19 MG/DL (ref 7–18)
CALCIUM BLD-MCNC: 8.9 MG/DL (ref 8.5–10.1)
CHLORIDE SERPL-SCNC: 107 MMOL/L (ref 98–112)
CO2 SERPL-SCNC: 27 MMOL/L (ref 21–32)
CREAT BLD-MCNC: 1.13 MG/DL
EGFRCR SERPLBLD CKD-EPI 2021: 91 ML/MIN/1.73M2 (ref 60–?)
EOSINOPHIL # BLD AUTO: 0.21 X10(3) UL (ref 0–0.7)
EOSINOPHIL NFR BLD AUTO: 1.9 %
ERYTHROCYTE [DISTWIDTH] IN BLOOD BY AUTOMATED COUNT: 11.9 %
GLOBULIN PLAS-MCNC: 3.7 G/DL (ref 2.8–4.4)
GLUCOSE BLD-MCNC: 95 MG/DL (ref 70–99)
HCT VFR BLD AUTO: 42.2 %
HGB BLD-MCNC: 15.1 G/DL
IMM GRANULOCYTES # BLD AUTO: 0.06 X10(3) UL (ref 0–1)
IMM GRANULOCYTES NFR BLD: 0.5 %
LYMPHOCYTES # BLD AUTO: 3.03 X10(3) UL (ref 1–4)
LYMPHOCYTES NFR BLD AUTO: 27 %
MCH RBC QN AUTO: 30.6 PG (ref 26–34)
MCHC RBC AUTO-ENTMCNC: 35.8 G/DL (ref 31–37)
MCV RBC AUTO: 85.6 FL
MONOCYTES # BLD AUTO: 1 X10(3) UL (ref 0.1–1)
MONOCYTES NFR BLD AUTO: 8.9 %
NEUTROPHILS # BLD AUTO: 6.9 X10 (3) UL (ref 1.5–7.7)
NEUTROPHILS # BLD AUTO: 6.9 X10(3) UL (ref 1.5–7.7)
NEUTROPHILS NFR BLD AUTO: 61.4 %
OSMOLALITY SERPL CALC.SUM OF ELEC: 290 MOSM/KG (ref 275–295)
PLATELET # BLD AUTO: 258 10(3)UL (ref 150–450)
POTASSIUM SERPL-SCNC: 4 MMOL/L (ref 3.5–5.1)
PROT SERPL-MCNC: 7.7 G/DL (ref 6.4–8.2)
RBC # BLD AUTO: 4.93 X10(6)UL
SODIUM SERPL-SCNC: 139 MMOL/L (ref 136–145)
TROPONIN I HIGH SENSITIVITY: 8 NG/L
WBC # BLD AUTO: 11.2 X10(3) UL (ref 4–11)

## 2023-10-07 PROCEDURE — 80053 COMPREHEN METABOLIC PANEL: CPT | Performed by: EMERGENCY MEDICINE

## 2023-10-07 PROCEDURE — 84484 ASSAY OF TROPONIN QUANT: CPT | Performed by: EMERGENCY MEDICINE

## 2023-10-07 PROCEDURE — 99283 EMERGENCY DEPT VISIT LOW MDM: CPT

## 2023-10-07 PROCEDURE — 99284 EMERGENCY DEPT VISIT MOD MDM: CPT

## 2023-10-07 PROCEDURE — 85025 COMPLETE CBC W/AUTO DIFF WBC: CPT | Performed by: EMERGENCY MEDICINE

## 2023-10-07 PROCEDURE — 93005 ELECTROCARDIOGRAM TRACING: CPT

## 2023-10-07 PROCEDURE — 36415 COLL VENOUS BLD VENIPUNCTURE: CPT

## 2023-10-07 PROCEDURE — 93010 ELECTROCARDIOGRAM REPORT: CPT

## 2023-10-08 LAB
ATRIAL RATE: 71 BPM
P AXIS: 53 DEGREES
P-R INTERVAL: 144 MS
Q-T INTERVAL: 370 MS
QRS DURATION: 90 MS
QTC CALCULATION (BEZET): 402 MS
R AXIS: 55 DEGREES
T AXIS: 40 DEGREES
VENTRICULAR RATE: 71 BPM

## 2023-10-08 NOTE — ED INITIAL ASSESSMENT (HPI)
Pt arrives to ED with c/o feeling palpitations and chest pain that has been going on for a month but has gotten worse today. Pt does report that he has a hx of anxiety. No shortness of breath.

## 2023-10-08 NOTE — CM/SW NOTE
2145:  Called by Dr. Kate Jackson stating she is placing holter monitor order for patient and she is requesting ERCM to assist with getting pt an expedited appointment to get holter monitor placed. 2146:  ERCM called and spoke with NEW Darden RN and informed her of the above and to please confirm pt's contact numbers on face sheet are correct and inform patient EDW ERCM will be contacting patient Monday 10/9 with expedited appointment to have holter monitor placed. Wilfredo Cushing RN confirmed pt's contact number on face sheet are correct and informed patient of all of the above.   Pt's confirmed contact# [phone]

## 2023-10-09 ENCOUNTER — HOSPITAL ENCOUNTER (OUTPATIENT)
Dept: CV DIAGNOSTICS | Facility: HOSPITAL | Age: 28
Discharge: HOME | End: 2023-10-09
Attending: EMERGENCY MEDICINE
Payer: COMMERCIAL

## 2023-10-09 DIAGNOSIS — R00.2 PALPITATIONS: ICD-10-CM

## 2023-10-09 PROCEDURE — 93226 XTRNL ECG REC<48 HR SCAN A/R: CPT | Performed by: EMERGENCY MEDICINE

## 2023-10-09 PROCEDURE — 93225 XTRNL ECG REC<48 HRS REC: CPT | Performed by: EMERGENCY MEDICINE

## 2023-10-23 DIAGNOSIS — G44.011 INTRACTABLE EPISODIC CLUSTER HEADACHE: ICD-10-CM

## 2023-10-23 RX ORDER — DIVALPROEX SODIUM 250 MG/1
250 TABLET, EXTENDED RELEASE ORAL DAILY
Qty: 90 TABLET | Refills: 3 | Status: CANCELLED | OUTPATIENT
Start: 2023-10-23

## 2023-10-23 NOTE — TELEPHONE ENCOUNTER
Called pharmacy spoke with Kaitlyn Sebastian and confirmed refill too soon    Medication: divalproex  MG Oral Tablet 24 Hr     Date of last refill: 07/28/23 (90/3)  [place] [birth year]    Last office visit: 07/28/23  Due back to clinic per last office note:  9 months  Date next office visit scheduled:    Future Appointments   Date Time Provider Bibi Rabago   10/26/2023  1:30 PM Xavier Galo LCSW, KIRSTEN [facility] [place]   11/2/2023 11:00 AM Xavier Galo LCSW, KIRSTEN [facility] [place]   11/30/2023  2:30 PM DONA Ugalde        Last OV note recommendation:     Plan:  cont depakote low dose, ok to wean off in the future  PCP, Psychiatrist to follow  See orders and medications filed with this encounter. The patient indicates understanding of these issues and agrees with the plan. Discussed with patient in detail regarding the adverse and side effects of the medication.   RTC 9 months  Pt should go ER for any new or worsening symptoms and contact office

## 2023-11-04 ENCOUNTER — PATIENT MESSAGE (OUTPATIENT)
Dept: INTERNAL MEDICINE CLINIC | Facility: CLINIC | Age: 28
End: 2023-11-04

## 2023-11-06 ENCOUNTER — HOSPITAL ENCOUNTER (EMERGENCY)
Facility: HOSPITAL | Age: 28
Discharge: HOME | End: 2023-11-06
Attending: EMERGENCY MEDICINE
Payer: COMMERCIAL

## 2023-11-06 VITALS
OXYGEN SATURATION: 99 % | HEART RATE: 55 BPM | WEIGHT: 165 LBS | RESPIRATION RATE: 21 BRPM | DIASTOLIC BLOOD PRESSURE: 74 MMHG | TEMPERATURE: 98 F | SYSTOLIC BLOOD PRESSURE: 111 MMHG | BODY MASS INDEX: 24.44 KG/M2 | HEIGHT: 69 IN

## 2023-11-06 DIAGNOSIS — I49.3 PVC'S (PREMATURE VENTRICULAR CONTRACTIONS): Primary | ICD-10-CM

## 2023-11-06 LAB
ALBUMIN SERPL-MCNC: 4.2 G/DL (ref 3.4–5)
ALBUMIN/GLOB SERPL: 1.1 {RATIO} (ref 1–2)
ALP LIVER SERPL-CCNC: 39 U/L
ALT SERPL-CCNC: 21 U/L
ANION GAP SERPL CALC-SCNC: 7 MMOL/L (ref 0–18)
AST SERPL-CCNC: 10 U/L (ref 15–37)
BASOPHILS # BLD AUTO: 0.03 X10(3) UL (ref 0–0.2)
BASOPHILS NFR BLD AUTO: 0.3 %
BILIRUB SERPL-MCNC: 0.4 MG/DL (ref 0.1–2)
BUN BLD-MCNC: 11 MG/DL (ref 9–23)
CALCIUM BLD-MCNC: 9.4 MG/DL (ref 8.5–10.1)
CHLORIDE SERPL-SCNC: 106 MMOL/L (ref 98–112)
CO2 SERPL-SCNC: 26 MMOL/L (ref 21–32)
CREAT BLD-MCNC: 0.92 MG/DL
EGFRCR SERPLBLD CKD-EPI 2021: 117 ML/MIN/1.73M2 (ref 60–?)
EOSINOPHIL # BLD AUTO: 0.08 X10(3) UL (ref 0–0.7)
EOSINOPHIL NFR BLD AUTO: 0.8 %
ERYTHROCYTE [DISTWIDTH] IN BLOOD BY AUTOMATED COUNT: 11.9 %
GLOBULIN PLAS-MCNC: 3.7 G/DL (ref 2.8–4.4)
GLUCOSE BLD-MCNC: 87 MG/DL (ref 70–99)
HCT VFR BLD AUTO: 44.6 %
HGB BLD-MCNC: 15.6 G/DL
IMM GRANULOCYTES # BLD AUTO: 0.03 X10(3) UL (ref 0–1)
IMM GRANULOCYTES NFR BLD: 0.3 %
LYMPHOCYTES # BLD AUTO: 2.47 X10(3) UL (ref 1–4)
LYMPHOCYTES NFR BLD AUTO: 23.9 %
MAGNESIUM SERPL-MCNC: 2.1 MG/DL (ref 1.6–2.6)
MCH RBC QN AUTO: 30.9 PG (ref 26–34)
MCHC RBC AUTO-ENTMCNC: 35 G/DL (ref 31–37)
MCV RBC AUTO: 88.3 FL
MONOCYTES # BLD AUTO: 0.86 X10(3) UL (ref 0.1–1)
MONOCYTES NFR BLD AUTO: 8.3 %
NEUTROPHILS # BLD AUTO: 6.88 X10 (3) UL (ref 1.5–7.7)
NEUTROPHILS # BLD AUTO: 6.88 X10(3) UL (ref 1.5–7.7)
NEUTROPHILS NFR BLD AUTO: 66.4 %
OSMOLALITY SERPL CALC.SUM OF ELEC: 287 MOSM/KG (ref 275–295)
PLATELET # BLD AUTO: 222 10(3)UL (ref 150–450)
POTASSIUM SERPL-SCNC: 3.8 MMOL/L (ref 3.5–5.1)
PROT SERPL-MCNC: 7.9 G/DL (ref 6.4–8.2)
RBC # BLD AUTO: 5.05 X10(6)UL
SODIUM SERPL-SCNC: 139 MMOL/L (ref 136–145)
TSI SER-ACNC: 1.03 MIU/ML (ref 0.36–3.74)
WBC # BLD AUTO: 10.4 X10(3) UL (ref 4–11)

## 2023-11-06 PROCEDURE — 84443 ASSAY THYROID STIM HORMONE: CPT | Performed by: EMERGENCY MEDICINE

## 2023-11-06 PROCEDURE — 93005 ELECTROCARDIOGRAM TRACING: CPT

## 2023-11-06 PROCEDURE — 93010 ELECTROCARDIOGRAM REPORT: CPT

## 2023-11-06 PROCEDURE — 99284 EMERGENCY DEPT VISIT MOD MDM: CPT

## 2023-11-06 PROCEDURE — 36415 COLL VENOUS BLD VENIPUNCTURE: CPT

## 2023-11-06 PROCEDURE — 85025 COMPLETE CBC W/AUTO DIFF WBC: CPT | Performed by: EMERGENCY MEDICINE

## 2023-11-06 PROCEDURE — 80053 COMPREHEN METABOLIC PANEL: CPT | Performed by: EMERGENCY MEDICINE

## 2023-11-06 PROCEDURE — 83735 ASSAY OF MAGNESIUM: CPT | Performed by: EMERGENCY MEDICINE

## 2023-11-06 NOTE — DISCHARGE INSTRUCTIONS
You may attempt to discontinue all caffeine for several days to see if this will alleviate your symptoms. Return to the emergency department for new or worsening symptoms.

## 2023-11-06 NOTE — ED INITIAL ASSESSMENT (HPI)
Patient reports heart palpitations x 1 month, yesterday more consistent. Seen 1 month and had holter monitor with no abnormalities. paranoia and anxiety with suicidal ideation.

## 2023-11-07 LAB
ATRIAL RATE: 65 BPM
P AXIS: 33 DEGREES
P-R INTERVAL: 146 MS
Q-T INTERVAL: 388 MS
QRS DURATION: 88 MS
QTC CALCULATION (BEZET): 403 MS
R AXIS: 37 DEGREES
T AXIS: 21 DEGREES
VENTRICULAR RATE: 65 BPM

## 2023-11-07 RX ORDER — PANTOPRAZOLE SODIUM 40 MG/1
40 TABLET, DELAYED RELEASE ORAL AS NEEDED
Qty: 90 TABLET | Refills: 0 | Status: SHIPPED | OUTPATIENT
Start: 2023-11-07 | End: 2024-02-05

## 2023-11-07 NOTE — TELEPHONE ENCOUNTER
Pt was in ER today being evaluated     Please review request for protonix if appropriate. Last filled   Pantoprazole Sodium 40 MG Oral Tab  tablet 1 7/6/2021 10/4/2021    Sig - Route: Take 1 tablet (40 mg total) by mouth 2 (two) times daily before meals. - Oral    Patient taking differently: Take 40 mg by mouth as needed.         Sent to pharmacy as: Pantoprazole Sodium 40 MG Oral Tablet Delayed Release (PROTONIX)    E-Prescribing Status: Receipt confirmed by pharmacy (7/6/2021  1:12 PM CDT)      Pharmacy    [facility] [id number] [facility], [address] [facility]  [phone] [phone], [phone]

## 2023-11-07 NOTE — TELEPHONE ENCOUNTER
From: Renetta Hendricks  To:  Dion Garcia  Sent: 11/4/2023 9:08 PM CDT  Subject: Prescription refill     I can't find my medicine for protonix on mychart I was looking to have it refilled as I noticed symptoms have increased

## 2023-11-10 ENCOUNTER — OFFICE VISIT (OUTPATIENT)
Dept: INTERNAL MEDICINE CLINIC | Facility: CLINIC | Age: 28
End: 2023-11-10
Payer: COMMERCIAL

## 2023-11-10 VITALS
DIASTOLIC BLOOD PRESSURE: 72 MMHG | HEIGHT: 69 IN | WEIGHT: 170.19 LBS | OXYGEN SATURATION: 97 % | HEART RATE: 63 BPM | TEMPERATURE: 98 F | SYSTOLIC BLOOD PRESSURE: 112 MMHG | BODY MASS INDEX: 25.21 KG/M2

## 2023-11-10 DIAGNOSIS — I49.3 SYMPTOMATIC PVCS: Primary | ICD-10-CM

## 2023-11-10 DIAGNOSIS — R14.2 BELCHING: ICD-10-CM

## 2023-11-10 DIAGNOSIS — K21.9 GASTROESOPHAGEAL REFLUX DISEASE WITHOUT ESOPHAGITIS: ICD-10-CM

## 2023-11-10 LAB
ATRIAL RATE: 60 BPM
P AXIS: 43 DEGREES
P-R INTERVAL: 142 MS
Q-T INTERVAL: 400 MS
QRS DURATION: 94 MS
QTC CALCULATION (BEZET): 400 MS
R AXIS: 50 DEGREES
T AXIS: 38 DEGREES
VENTRICULAR RATE: 60 BPM

## 2023-11-10 PROCEDURE — 3078F DIAST BP <80 MM HG: CPT | Performed by: STUDENT IN AN ORGANIZED HEALTH CARE EDUCATION/TRAINING PROGRAM

## 2023-11-10 PROCEDURE — 3008F BODY MASS INDEX DOCD: CPT | Performed by: STUDENT IN AN ORGANIZED HEALTH CARE EDUCATION/TRAINING PROGRAM

## 2023-11-10 PROCEDURE — 93000 ELECTROCARDIOGRAM COMPLETE: CPT | Performed by: STUDENT IN AN ORGANIZED HEALTH CARE EDUCATION/TRAINING PROGRAM

## 2023-11-10 PROCEDURE — 99214 OFFICE O/P EST MOD 30 MIN: CPT | Performed by: STUDENT IN AN ORGANIZED HEALTH CARE EDUCATION/TRAINING PROGRAM

## 2023-11-10 PROCEDURE — 3074F SYST BP LT 130 MM HG: CPT | Performed by: STUDENT IN AN ORGANIZED HEALTH CARE EDUCATION/TRAINING PROGRAM

## 2023-11-10 NOTE — PATIENT INSTRUCTIONS
//PVCs   -Metoprolol 12.5mg twice a day as needed for palpitations  -2 week holter monitor, cardiology follow-up thereafter. -EKG today normal    //Belching  //GERD  -Protonix 20mg twice a day before meals. -TUMS as needed  -If no improvement, call/mychart.  -Walk and small meals throughout the day.

## 2023-11-16 ENCOUNTER — HOSPITAL ENCOUNTER (OUTPATIENT)
Dept: CV DIAGNOSTICS | Facility: HOSPITAL | Age: 28
Discharge: HOME | End: 2023-11-16
Attending: STUDENT IN AN ORGANIZED HEALTH CARE EDUCATION/TRAINING PROGRAM
Payer: COMMERCIAL

## 2023-11-16 DIAGNOSIS — I49.3 SYMPTOMATIC PVCS: ICD-10-CM

## 2023-11-16 PROCEDURE — 93246 EXT ECG>7D<15D RECORDING: CPT | Performed by: STUDENT IN AN ORGANIZED HEALTH CARE EDUCATION/TRAINING PROGRAM

## 2023-11-16 PROCEDURE — 93247 EXT ECG>7D<15D SCAN A/R: CPT | Performed by: STUDENT IN AN ORGANIZED HEALTH CARE EDUCATION/TRAINING PROGRAM

## 2023-12-12 ENCOUNTER — OFFICE VISIT (OUTPATIENT)
Dept: INTERNAL MEDICINE CLINIC | Facility: CLINIC | Age: 28
End: 2023-12-12
Payer: COMMERCIAL

## 2023-12-12 VITALS
HEIGHT: 69 IN | DIASTOLIC BLOOD PRESSURE: 70 MMHG | WEIGHT: 169.19 LBS | OXYGEN SATURATION: 98 % | TEMPERATURE: 98 F | HEART RATE: 74 BPM | BODY MASS INDEX: 25.06 KG/M2 | SYSTOLIC BLOOD PRESSURE: 112 MMHG

## 2023-12-12 DIAGNOSIS — K21.9 GASTROESOPHAGEAL REFLUX DISEASE, UNSPECIFIED WHETHER ESOPHAGITIS PRESENT: ICD-10-CM

## 2023-12-12 DIAGNOSIS — I49.3 SYMPTOMATIC PVCS: Primary | ICD-10-CM

## 2023-12-12 PROBLEM — R12 HEARTBURN: Status: RESOLVED | Noted: 2020-04-20 | Resolved: 2023-12-12

## 2023-12-12 PROBLEM — R63.4 WEIGHT LOSS: Status: RESOLVED | Noted: 2020-04-20 | Resolved: 2023-12-12

## 2023-12-12 PROBLEM — R07.9 CHEST PAIN: Status: RESOLVED | Noted: 2020-04-20 | Resolved: 2023-12-12

## 2023-12-12 PROCEDURE — 3074F SYST BP LT 130 MM HG: CPT | Performed by: STUDENT IN AN ORGANIZED HEALTH CARE EDUCATION/TRAINING PROGRAM

## 2023-12-12 PROCEDURE — 3078F DIAST BP <80 MM HG: CPT | Performed by: STUDENT IN AN ORGANIZED HEALTH CARE EDUCATION/TRAINING PROGRAM

## 2023-12-12 PROCEDURE — 3008F BODY MASS INDEX DOCD: CPT | Performed by: STUDENT IN AN ORGANIZED HEALTH CARE EDUCATION/TRAINING PROGRAM

## 2023-12-12 PROCEDURE — 99213 OFFICE O/P EST LOW 20 MIN: CPT | Performed by: STUDENT IN AN ORGANIZED HEALTH CARE EDUCATION/TRAINING PROGRAM

## 2024-01-16 PROBLEM — R12 CHRONIC HEARTBURN: Status: ACTIVE | Noted: 2024-01-16

## 2024-01-25 RX ORDER — PANTOPRAZOLE SODIUM 40 MG/1
40 TABLET, DELAYED RELEASE ORAL AS NEEDED
Qty: 90 TABLET | Refills: 0 | Status: SHIPPED | OUTPATIENT
Start: 2024-01-25 | End: 2024-04-24

## 2024-01-30 ENCOUNTER — EMPLOYEE HEALTH (OUTPATIENT)
Dept: OTHER | Facility: HOSPITAL | Age: 29
End: 2024-01-30
Attending: PREVENTIVE MEDICINE

## 2024-01-30 DIAGNOSIS — Z11.1 SCREENING-PULMONARY TB: Primary | ICD-10-CM

## 2024-01-30 PROCEDURE — 86480 TB TEST CELL IMMUN MEASURE: CPT

## 2024-01-31 LAB
M TB IFN-G CD4+ T-CELLS BLD-ACNC: 0.07 IU/ML
M TB TUBERC IFN-G BLD QL: NEGATIVE
M TB TUBERC IGNF/MITOGEN IGNF CONTROL: >10 IU/ML
QFT TB1 AG MINUS NIL: -0.02 IU/ML
QFT TB2 AG MINUS NIL: -0.02 IU/ML

## 2024-04-02 RX ORDER — MIRTAZAPINE 7.5 MG/1
3.75 TABLET, FILM COATED ORAL NIGHTLY
Refills: 0 | OUTPATIENT
Start: 2024-04-02

## 2024-04-28 DIAGNOSIS — G44.011 INTRACTABLE EPISODIC CLUSTER HEADACHE: ICD-10-CM

## 2024-04-29 RX ORDER — PANTOPRAZOLE SODIUM 40 MG/1
40 TABLET, DELAYED RELEASE ORAL
Qty: 90 TABLET | Refills: 0 | Status: SHIPPED | OUTPATIENT
Start: 2024-04-29

## 2024-04-29 NOTE — TELEPHONE ENCOUNTER
Sent the patient a [employer] message to make an appointment for further medication refills.       Medication: divalproex  MG Oral Tablet 24 Hr      Date of last refill: 07/28/2023 (#90/3)  [place]     Last office visit: 07/28/2023  Due back to clinic per last office note:  RTC 9 months  Date next office visit scheduled:    Future Appointments   Date Time Provider Department Center   4/30/2024 11:00 AM Coni Denise APRN [facility] [id number]   5/9/2024 10:00 AM Le Gordillo LCSW, CADURIEL [facility] [place]   5/13/2024 12:30 PM Le Gordillo LCSW, CADURIEL [facility] [place]           Last OV note recommendation:    Assessment:  Headache disorder: likely tension type headache  Anxiety      Plan:  cont depakote low dose, ok to wean off in the future  PCP, Psychiatrist to follow  See orders and medications filed with this encounter. The patient indicates understanding of these issues and agrees with the plan.  Discussed with patient in detail regarding the adverse and side effects of the medication.  RTC 9 months  Pt should go ER for any new or worsening symptoms and contact office

## 2024-04-30 ENCOUNTER — TELEPHONE (OUTPATIENT)
Dept: NEUROLOGY | Facility: CLINIC | Age: 29
End: 2024-04-30

## 2024-04-30 RX ORDER — DIVALPROEX SODIUM 250 MG/1
250 TABLET, EXTENDED RELEASE ORAL DAILY
Qty: 90 TABLET | Refills: 0 | Status: SHIPPED | OUTPATIENT
Start: 2024-04-30

## 2024-04-30 NOTE — TELEPHONE ENCOUNTER
Spoke with [facility] Pharmacist (Marlene ) who states disregard refill request. Will get refill ready for patient.

## 2024-04-30 NOTE — TELEPHONE ENCOUNTER
Patient needs refill on divalproex  MG Oral Tablet 24 Hr  and sent to     [id number] - [place], [place] - [address] [phone], [phone]   [address] [facility] [address]   Phone: [phone] Fax: [phone]

## 2024-05-28 DIAGNOSIS — G44.011 INTRACTABLE EPISODIC CLUSTER HEADACHE: ICD-10-CM

## 2024-05-28 DIAGNOSIS — F41.1 GENERALIZED ANXIETY DISORDER: ICD-10-CM

## 2024-05-28 RX ORDER — DIVALPROEX SODIUM 250 MG/1
250 TABLET, EXTENDED RELEASE ORAL DAILY
Qty: 90 TABLET | Refills: 0 | Status: CANCELLED | OUTPATIENT
Start: 2024-05-28

## 2024-05-28 NOTE — TELEPHONE ENCOUNTER
Medication: divalproex  MG Oral Tablet 24 Hr      Date of last refill: 04/30/2024 (#90/0)  [place]     Last office visit: 05/27/2022  Due back to clinic per last office note:  1 year  Date next office visit scheduled:    Future Appointments   Date Time Provider Department Center   6/5/2024 11:00 AM Le Gordillo LCSW, CADC [facility] [place]   6/7/2024  9:40 AM Laurel Flood MD [facility] [place]   6/12/2024 11:00 AM Le Gordillo LCSW, KIRSTEN [facility] [place]   6/17/2024 12:30 PM Le Gordillo LCSW, CADURIEL [facility] [place]   7/30/2024 11:00 AM Coni Denise, DONA [facility] [id number]           Last OV note recommendation:    Assessment:   Headache disorder: likely tension type headache  Anxiety      Plan:  cont depakote low dose, ok to wean off in the future  PCP, Psychiatrist to follow  See orders and medications filed with this encounter. The patient indicates understanding of these issues and agrees with the plan.  Discussed with patient in detail regarding the adverse and side effects of the medication.  RTC 6-9 months  Pt should go ER for any new or worsening symptoms and contact office

## 2024-05-29 RX ORDER — ALPRAZOLAM 0.25 MG/1
TABLET ORAL DAILY PRN
Refills: 0 | OUTPATIENT
Start: 2024-05-29

## 2024-05-31 RX ORDER — PANTOPRAZOLE SODIUM 40 MG/1
40 TABLET, DELAYED RELEASE ORAL
Qty: 90 TABLET | Refills: 3 | Status: SHIPPED | OUTPATIENT
Start: 2024-05-31

## 2024-05-31 NOTE — TELEPHONE ENCOUNTER
Patient has refills left at the pharmacy. Patient will also need a visit in office to received refills.

## 2024-05-31 NOTE — TELEPHONE ENCOUNTER
REFILL PASSED PER [facility] PROTOCOLS    Requested Prescriptions   Pending Prescriptions Disp Refills    pantoprazole 40 MG Oral Tab EC 90 tablet 0     Sig: Take 1 tablet (40 mg total) by mouth daily as needed.       Gastrointestional Medication Protocol Passed - 5/28/2024  2:10 PM        Passed - In person appointment or virtual visit in the past 12 mos or appointment in next 3 mos     Recent Outpatient Visits              5 months ago Symptomatic PVCs    [facility], Leonel Lozada Yumna, MD    Office Visit    5 months ago Palpitations    [facility] Kimberly Hull MD    Office Visit    6 months ago Symptomatic PVCs    [facility], Leonel Lozada Yumna, MD    Office Visit    9 months ago Routine general medical examination at a health care facility    [facility], [address] [place] [address] Dion Garcia MD    Office Visit    10 months ago Intractable episodic cluster headache    [facility] Laurel Flood MD    Telemedicine          Future Appointments         Provider Department Appt Notes    In 1 week Laurel Flood MD [facility] Headaches                         Future Appointments         Provider Department Appt Notes    In 1 week Laurel Flood MD [facility] Headaches          Recent Outpatient Visits              5 months ago Symptomatic PVCs    [facility], Leonel Lozada Yumna, MD    Office Visit    5 months ago Palpitations    [facility] Kimberly Hull MD    Office Visit    6 months ago Symptomatic PVCs    [facility] [place] [facility]    Office Visit    9 months ago Routine general medical examination at a health care facility    [place]  [facility], [address] [place] [address], Dion Saucedo MD    Office Visit    10 months ago Intractable episodic cluster headache    [facility], [facility], Laurel Brewer MD    Telemedicine

## 2024-06-29 DIAGNOSIS — F41.1 GENERALIZED ANXIETY DISORDER: ICD-10-CM

## 2024-06-29 DIAGNOSIS — G44.011 INTRACTABLE EPISODIC CLUSTER HEADACHE: ICD-10-CM

## 2024-07-01 RX ORDER — DIVALPROEX SODIUM 250 MG/1
250 TABLET, EXTENDED RELEASE ORAL DAILY
Qty: 90 TABLET | Refills: 0 | OUTPATIENT
Start: 2024-07-01

## 2024-07-01 RX ORDER — ALPRAZOLAM 0.25 MG/1
TABLET ORAL DAILY PRN
Refills: 0 | OUTPATIENT
Start: 2024-07-01

## 2024-07-01 NOTE — TELEPHONE ENCOUNTER
Refill request denied as it is too soon for a refill and patient needs to schedule a follow up appointment.

## 2024-07-05 DIAGNOSIS — G44.011 INTRACTABLE EPISODIC CLUSTER HEADACHE: ICD-10-CM

## 2024-07-05 RX ORDER — DIVALPROEX SODIUM 250 MG/1
250 TABLET, EXTENDED RELEASE ORAL DAILY
Qty: 90 TABLET | Refills: 0 | Status: CANCELLED | OUTPATIENT
Start: 2024-07-05

## 2024-07-05 RX ORDER — FLUTICASONE PROPIONATE 50 MCG
2 SPRAY, SUSPENSION (ML) NASAL DAILY
Qty: 48 G | Refills: 0 | Status: SHIPPED | OUTPATIENT
Start: 2024-07-05

## 2024-07-05 RX ORDER — PANTOPRAZOLE SODIUM 40 MG/1
40 TABLET, DELAYED RELEASE ORAL
Qty: 90 TABLET | Refills: 3 | OUTPATIENT
Start: 2024-07-05

## 2024-07-05 NOTE — TELEPHONE ENCOUNTER
Refill passed per [facility] protocol.    Has not been prescribed for 3 years-please review if refill is appropriate.     Requested Prescriptions   Pending Prescriptions Disp Refills    fluticasone propionate 50 MCG/ACT Nasal Suspension 16 g 0     Si sprays by Each Nare route daily.       Allergy Medication Protocol Passed - 2024  2:00 PM        Passed - In person appointment or virtual visit in the past 12 mos or appointment in next 3 mos     Recent Outpatient Visits              6 months ago Symptomatic PVCs    [facility], Leonel Lozada Yumna, MD    Office Visit    7 months ago Palpitations    [facility] Kimberly Hull MD    Office Visit    7 months ago Symptomatic [facility]    [facility], Leonel Lozada Yumna, MD    Office Visit    10 months ago Routine general medical examination at a health care facility    [facility], [address] [place] [address], Dion Saucedo MD    Office Visit    11 months ago Intractable episodic cluster headache    [facility], [facility], Laurel Brewer MD    Telemedicine                       Refused Prescriptions Disp Refills    pantoprazole 40 MG Oral Tab EC 90 tablet 3     Sig: Take 1 tablet (40 mg total) by mouth daily as needed.       Gastrointestional Medication Protocol Passed - 2024  2:00 PM        Passed - In person appointment or virtual visit in the past 12 mos or appointment in next 3 mos     Recent Outpatient Visits              6 months ago Symptomatic PVCs    [facility] [place] [facility]    Office Visit    7 months ago Palpitations    [facility] Kimberly Hull MD    Office Visit    7 months ago Symptomatic PVCs    [facility] [place] [facility], MD    Office Visit    10 months ago Routine general medical  examination at a health care facility    [facility], [address] [place] [address], Dion Saucedo MD    Office Visit    11 months ago Intractable episodic cluster headache    [facility], [facility], Laurel Brewer MD    Telemedicine                           Recent Outpatient Visits              6 months ago Symptomatic PVCs    [facility], BRETT [place], Eve Mattson MD    Office Visit    7 months ago Palpitations    [facility],  [facility] Kimberly Hull MD    Office Visit    7 months ago Symptomatic PVCs    [facility], BRETT [place]inner, Eve Mattson MD    Office Visit    10 months ago Routine general medical examination at a health care facility    [facility], [address] [place] [address], Dion Saucedo MD    Office Visit    11 months ago Intractable episodic cluster headache    [facility], [facility], Laurel Brewer MD    Telemedicine

## 2024-07-08 NOTE — TELEPHONE ENCOUNTER
Spoke with [facility] pharmacy [phone] Patient picked up #30 divalproex on 7/6. Previous refill was 5/59 also #30.    Patient needs to be seen for further refills as last  OFFICE VISIT was 2022.    [employer] message sent to patient with office number to schedule.

## 2024-07-10 RX ORDER — PANTOPRAZOLE SODIUM 40 MG/1
40 TABLET, DELAYED RELEASE ORAL
Qty: 90 TABLET | Refills: 3 | OUTPATIENT
Start: 2024-07-10

## 2024-10-28 DIAGNOSIS — G44.011 INTRACTABLE EPISODIC CLUSTER HEADACHE: ICD-10-CM

## 2024-10-29 NOTE — TELEPHONE ENCOUNTER
Sent the patient a [employer] message to make an appointment for further medication refills. Patient has not been seen in office since 2022 and last televisit was 2023.       Medication:  DIVALPROEX  MG Oral Tablet 24 Hr      Date of last refill: 04/30/2024 (#90/0)  [place]     Last office visit: 07/28/2023-tele  Due back to clinic per last office note:  9 months  Date next office visit scheduled:    Future Appointments   Date Time Provider Department Center   10/29/2024 11:00 AM Coni Denise APRN [facility] [id number]   11/4/2024 11:00 AM Le Gordillo LCSW, KIRSTEN [facility] [place]   11/14/2024 11:00 AM Le Gordillo LCSW, KIRSTEN [facility] [place]   11/21/2024 11:00 AM Le Gordillo LCSW, CADURIEL [facility] [place]           Last OV note recommendation:    Plan:  cont depakote low dose, ok to wean off in the future  PCP, Psychiatrist to follow  See orders and medications filed with this encounter. The patient indicates understanding of these issues and agrees with the plan.  Discussed with patient in detail regarding the adverse and side effects of the medication.  RTC 9 months  Pt should go ER for any new or worsening symptoms and contact office

## 2024-11-05 RX ORDER — DIVALPROEX SODIUM 250 MG/1
250 TABLET, FILM COATED, EXTENDED RELEASE ORAL DAILY
Qty: 30 TABLET | Refills: 0 | OUTPATIENT
Start: 2024-11-05

## 2024-11-05 NOTE — TELEPHONE ENCOUNTER
Patient received for headaches. Patient last seen in office 05/27/2022, last telemedicine visit was 07/28/2023    Patient canceled on 06/07/2024

## 2024-11-10 DIAGNOSIS — G44.011 INTRACTABLE EPISODIC CLUSTER HEADACHE: ICD-10-CM

## 2024-11-10 RX ORDER — DIVALPROEX SODIUM 250 MG/1
250 TABLET, FILM COATED, EXTENDED RELEASE ORAL DAILY
Qty: 90 TABLET | Refills: 0 | OUTPATIENT
Start: 2024-11-10

## 2024-11-11 ENCOUNTER — HOSPITAL ENCOUNTER (EMERGENCY)
Facility: HOSPITAL | Age: 29
Discharge: HOME | End: 2024-11-11
Attending: EMERGENCY MEDICINE
Payer: COMMERCIAL

## 2024-11-11 ENCOUNTER — APPOINTMENT (OUTPATIENT)
Dept: GENERAL RADIOLOGY | Facility: HOSPITAL | Age: 29
End: 2024-11-11
Payer: COMMERCIAL

## 2024-11-11 VITALS
RESPIRATION RATE: 16 BRPM | SYSTOLIC BLOOD PRESSURE: 143 MMHG | BODY MASS INDEX: 22.22 KG/M2 | TEMPERATURE: 98 F | WEIGHT: 150 LBS | OXYGEN SATURATION: 97 % | DIASTOLIC BLOOD PRESSURE: 83 MMHG | HEIGHT: 69 IN | HEART RATE: 60 BPM

## 2024-11-11 DIAGNOSIS — S60.10XA SUBUNGUAL HEMATOMA OF FINGERNAIL, INITIAL ENCOUNTER: Primary | ICD-10-CM

## 2024-11-11 DIAGNOSIS — S62.524A CLOSED NONDISPLACED FRACTURE OF DISTAL PHALANX OF RIGHT THUMB, INITIAL ENCOUNTER: ICD-10-CM

## 2024-11-11 PROCEDURE — 73140 X-RAY EXAM OF FINGER(S): CPT

## 2024-11-11 PROCEDURE — 99283 EMERGENCY DEPT VISIT LOW MDM: CPT

## 2024-11-11 PROCEDURE — 26750 TREAT FINGER FRACTURE EACH: CPT

## 2024-11-11 RX ORDER — DIVALPROEX SODIUM 250 MG/1
250 TABLET, FILM COATED, EXTENDED RELEASE ORAL DAILY
Qty: 90 TABLET | Refills: 0 | Status: SHIPPED | OUTPATIENT
Start: 2024-11-11

## 2024-11-11 NOTE — TELEPHONE ENCOUNTER
Sent the patient a [employer] message to make an appointment for further medication refills.       Medication: divalproex  MG Oral Tablet 24 Hr      Date of last refill: 11/11/2024 (#90/0)  [place]     Last office visit: 07/28/2023  Due back to clinic per last office note:  9 months  Date next office visit scheduled:    Future Appointments   Date Time Provider Department Center   11/14/2024 11:00 AM Le Gordillo LCSW, KIRSTEN [facility]   11/21/2024 11:00 AM Le Gordillo LCSW, KIRSTEN [facility] [place]   11/26/2024 11:00 AM Coni Denise, DONA [facility] [id number]           Last OV note recommendation:    Plan:  cont depakote low dose, ok to wean off in the future  PCP, Psychiatrist to follow  See orders and medications filed with this encounter. The patient indicates understanding of these issues and agrees with the plan.  Discussed with patient in detail regarding the adverse and side effects of the medication.  RTC 9 months  Pt should go ER for any new or worsening symptoms and contact office

## 2024-11-11 NOTE — TELEPHONE ENCOUNTER
Medication: divalproex  MG Oral Tablet 24 Hr      Date of last refill: 4/30/24 (#90/0)  [place]     Last office visit: 5/27/22  Due back to clinic per last office note:  RTC 6-9 months   Date next office visit scheduled:    Future Appointments   Date Time Provider Department Center   11/14/2024 11:00 AM Le Gordillo LCSW, KIRSTEN [facility] [place]   11/21/2024 11:00 AM Le Gordillo LCSW, KIRSTEN [facility] [place]   11/26/2024 11:00 AM Coni Denise, DONA [facility] [id number]           Last OV note recommendation:    Plan:  cont depakote low dose, ok to wean off in the future  PCP, Psychiatrist to follow  See orders and medications filed with this encounter. The patient indicates understanding of these issues and agrees with the plan.  Discussed with patient in detail regarding the adverse and side effects of the medication.  RTC 6-9 months  Pt should go ER for any new or worsening symptoms and contact office

## 2024-11-12 NOTE — ED PROVIDER NOTES
Patient Seen in: [facility] Emergency Department      History     Chief Complaint   Patient presents with    Finger Injury     Stated Complaint: Smashed right thumb, works for hospital    Subjective:   HPI      21-year-old male presenting to the emergency department for thumb injury.  Patient excellently smashed his right thumb on a engine block earlier today and now presents emerged part because it is his continued to throbbing there is blood underneath the nail.  He denies any other injury or any other exacerbating leaving factors associate symptoms tetanus is up-to-date.    Objective:     Past Medical History:    Abdominal pain    Anxiety    Belching    Bloating    Chest pain    Chronic cough    Temporarily after eating    Decorative tattoo    Fatigue    Frequent use of laxatives    Headache disorder    Heart palpitations    Heartburn    History of depression    History of mental disorder    Indigestion    Loss of appetite    Sleep disturbance    Stress    Uncomfortable fullness after meals    Weight loss              Past Surgical History:   Procedure Laterality Date    Other surgical history      wisdom teeth extracted    Tonsillectomy                  Social History     Socioeconomic History    Marital status: Single   Occupational History    Occupation: [employer]   Tobacco Use    Smoking status: Never    Smokeless tobacco: Never   Vaping Use    Vaping status: Former    Substances: Nicotine, CBD   Substance and Sexual Activity    Alcohol use: Yes     Alcohol/week: 1.0 standard drink of alcohol     Types: 1 Cans of beer per week     Comment: Social-rare    Drug use: Not Currently     Types: Cannabis     Comment: 2014-tried cannabis in college. uses CBD oil    Sexual activity: Not Currently   Other Topics Concern     Service No    Caffeine Concern Yes     Comment: coffee daily    Sleep Concern Yes    Stress Concern Yes     Comment: anxiety    Exercise No    Seat Belt Yes                   Physical Exam     ED Triage Vitals   BP 11/11/24 2231 143/83   Pulse 11/11/24 2231 60   Resp 11/11/24 2231 16   Temp 11/11/24 2232 98.3 °F (36.8 °C)   Temp [facility] --    SpO2 11/11/24 2231 97 %   O2 Device 11/11/24 2231 None (Room air)       Current Vitals:   Vital Signs  BP: 143/83  Pulse: 60  Resp: 16  Temp: 98.3 °F (36.8 °C)    Oxygen Therapy  SpO2: 97 %  O2 Device: None (Room air)        Physical Exam  Awake alert patient appears no distress HEENT exam is normal lungs are clear cardiovascular exam regular rhythm abdomen soft nontender EXTR no cyanosis or edema right thumb patient is noted to have subtle subungual hematoma and bruising on the distal pad but no other tenderness to the right thumb or right hand.  Decree sensation no streaking redness    ED Course   Labs Reviewed - No data to display         Differential diagnosis includes open fracture, necrotizing fasciitis       MDM              Medical Decision Making  29-year-old male presenting with thumb injury from crush injury.  We drained the subungual hematoma using a heated source.  Blood is drained patient is feeling better.  We discussed with the patient still the potential for abnormalities.  Patient has been dressed finger was splinted evaluated independent interpretation by ED physician x-ray shows a tuft fracture of the distal phalanx patient was referred to orthopedics patient will be discharged home he was offered pain medication he is refusing he is to return emerged part worsening symptoms other complaints  The patient was screened and evaluated during this visit.  As a treating physician attending to the patient, I determined, within reasonable clinical confidence and prior to discharge, that an emergency medical condition was not or was no longer present.  There was no indication for further evaluation, treatment or admission on an emergency basis.    The usual and customary discharge instructions were discussed given the patient's ER  course.  We discussed signs and symptoms that should prompt the patient's immediate return to the emergency department.  Reasonable over-the-counter and prescription treatment options and physician follow-up plan was discussed.  Patient was discharged home in good condition  This note was prepared using Dragon Medical voice recognition dictation software.  As a result errors may occur.  When identified to these areas have been corrected.  While every attempt is made to correct errors during dictation discrepancies may still exist.  Please contact if there are any errors    Problems Addressed:  Closed nondisplaced fracture of distal phalanx of right thumb, initial encounter: acute illness or injury  Subungual hematoma of fingernail, initial encounter: acute illness or injury    Amount and/or Complexity of Data Reviewed  Radiology: ordered and independent interpretation performed. Decision-making details documented in ED Course.  ECG/medicine tests: ordered and independent interpretation performed. Decision-making details documented in ED Course.        Disposition and Plan     Clinical Impression:  1. Subungual hematoma of fingernail, initial encounter    2. Closed nondisplaced fracture of distal phalanx of right thumb, initial encounter         Disposition:  Discharge  11/11/2024 11:53 pm    Follow-up:  Andrew Bustillo MD  [address] [place], [place] [address]  [address]  [facility] [address]  [phone]    Follow up in 1 week(s)            Medications Prescribed:  Current Discharge Medication List              Supplementary Documentation:

## 2024-11-12 NOTE — ED INITIAL ASSESSMENT (HPI)
Pt arrives to ED with right thumb injury. Pt states he smashed in a big piece of metal this morning at work. Works at [employer]. Pt states the pain is mostly at the nailbed but radiated up his hand earlier. No bleeding.

## 2024-11-14 RX ORDER — PANTOPRAZOLE SODIUM 40 MG/1
40 TABLET, DELAYED RELEASE ORAL
Qty: 90 TABLET | Refills: 3 | OUTPATIENT
Start: 2024-11-14

## 2024-12-13 RX ORDER — PANTOPRAZOLE SODIUM 40 MG/1
40 TABLET, DELAYED RELEASE ORAL
Qty: 90 TABLET | Refills: 3 | OUTPATIENT
Start: 2024-12-13

## 2025-01-07 DIAGNOSIS — F41.1 GENERALIZED ANXIETY DISORDER: ICD-10-CM

## 2025-01-09 RX ORDER — ALPRAZOLAM 0.25 MG
0.25 TABLET ORAL DAILY PRN
Refills: 0 | OUTPATIENT
Start: 2025-01-09

## 2025-02-04 DIAGNOSIS — G44.011 INTRACTABLE EPISODIC CLUSTER HEADACHE: ICD-10-CM

## 2025-02-04 RX ORDER — DIVALPROEX SODIUM 250 MG/1
250 TABLET, FILM COATED, EXTENDED RELEASE ORAL DAILY
Qty: 90 TABLET | Refills: 0 | OUTPATIENT
Start: 2025-02-04

## 2025-02-04 NOTE — TELEPHONE ENCOUNTER
Medication: DIVALPROEX  MG Oral Tablet 24 Hr      Date of last refill: 11/11/2024 (#90/0)  [place]     Last office visit: 05/27/2022  Due back to clinic per last office note:  6-9 Months   Date next office visit scheduled:    Future Appointments   Date Time Provider Department Center   2/26/2025 11:00 AM Coni Denise, DONA [facility] [id number]           Last OV note recommendation:       Assessment:   Headache disorder: likely tension type headache  Anxiety      Plan:  cont depakote low dose, ok to wean off in the future  PCP, Psychiatrist to follow  See orders and medications filed with this encounter. The patient indicates understanding of these issues and agrees with the plan.  Discussed with patient in detail regarding the adverse and side effects of the medication.  RTC 6-9 months  Pt should go ER for any new or worsening symptoms and contact office

## 2025-02-16 DIAGNOSIS — G44.011 INTRACTABLE EPISODIC CLUSTER HEADACHE: ICD-10-CM

## 2025-02-16 RX ORDER — DIVALPROEX SODIUM 250 MG/1
250 TABLET, FILM COATED, EXTENDED RELEASE ORAL DAILY
Qty: 90 TABLET | Refills: 0 | OUTPATIENT
Start: 2025-02-16

## 2025-02-18 NOTE — TELEPHONE ENCOUNTER
Sent the patient a [employer] message to make an appointment for further medication refills.         Medication: DIVALPROEX  MG Oral Tablet 24 Hr      Date of last refill: 11/11/2024 (#90/0)  [place]     Last office visit: 07/28/2023  Due back to clinic per last office note:  9 months  Date next office visit scheduled:    Future Appointments   Date Time Provider Department Center   2/26/2025 11:00 AM Coni Denise APRN [facility] [id number]           Last OV note recommendation:    Plan:  cont depakote low dose, ok to wean off in the future  PCP, Psychiatrist to follow  See orders and medications filed with this encounter. The patient indicates understanding of these issues and agrees with the plan.  Discussed with patient in detail regarding the adverse and side effects of the medication.  RTC 9 months  Pt should go ER for any new or worsening symptoms and contact office

## 2025-02-21 DIAGNOSIS — G44.011 INTRACTABLE EPISODIC CLUSTER HEADACHE: ICD-10-CM

## 2025-02-24 NOTE — TELEPHONE ENCOUNTER
Sent the patient a [employer] message to make an appointment for further medication refills. Patient has not been seen since 2023. Patient is aware to make an appointment from previous encounters.         Medication: DIVALPROEX  MG Oral Tablet 24 Hr      Date of last refill: 11/11/2024 (#90/0)  [place]     Last office visit: 07/28/2023  Due back to clinic per last office note:  9 months  Date next office visit scheduled:    Future Appointments   Date Time Provider Department Center   2/26/2025 11:00 AM Coni Denise APRN [facility] [id number]           Last OV note recommendation:    Plan:  cont depakote low dose, ok to wean off in the future  PCP, Psychiatrist to follow  See orders and medications filed with this encounter. The patient indicates understanding of these issues and agrees with the plan.  Discussed with patient in detail regarding the adverse and side effects of the medication.  RTC 9 months  Pt should go ER for any new or worsening symptoms and contact office

## 2025-02-26 RX ORDER — DIVALPROEX SODIUM 250 MG/1
250 TABLET, FILM COATED, EXTENDED RELEASE ORAL DAILY
Qty: 90 TABLET | Refills: 0 | OUTPATIENT
Start: 2025-02-26

## 2025-04-21 ENCOUNTER — OFFICE VISIT (OUTPATIENT)
Dept: INTERNAL MEDICINE CLINIC | Facility: CLINIC | Age: 30
End: 2025-04-21
Payer: COMMERCIAL

## 2025-04-21 ENCOUNTER — LAB ENCOUNTER (OUTPATIENT)
Dept: LAB | Age: 30
End: 2025-04-21
Attending: STUDENT IN AN ORGANIZED HEALTH CARE EDUCATION/TRAINING PROGRAM
Payer: COMMERCIAL

## 2025-04-21 VITALS
WEIGHT: 140 LBS | TEMPERATURE: 98 F | SYSTOLIC BLOOD PRESSURE: 120 MMHG | RESPIRATION RATE: 20 BRPM | BODY MASS INDEX: 20.73 KG/M2 | HEART RATE: 72 BPM | HEIGHT: 69 IN | OXYGEN SATURATION: 98 % | DIASTOLIC BLOOD PRESSURE: 70 MMHG

## 2025-04-21 DIAGNOSIS — Z13.29 SCREENING FOR ENDOCRINE, METABOLIC AND IMMUNITY DISORDER: ICD-10-CM

## 2025-04-21 DIAGNOSIS — Z13.0 SCREENING FOR ENDOCRINE, METABOLIC AND IMMUNITY DISORDER: ICD-10-CM

## 2025-04-21 DIAGNOSIS — Z00.00 PHYSICAL EXAM: ICD-10-CM

## 2025-04-21 DIAGNOSIS — Z13.0 SCREENING FOR DEFICIENCY ANEMIA: ICD-10-CM

## 2025-04-21 DIAGNOSIS — E53.8 VITAMIN B12 DEFICIENCY: ICD-10-CM

## 2025-04-21 DIAGNOSIS — Z13.220 SCREENING FOR LIPID DISORDERS: ICD-10-CM

## 2025-04-21 DIAGNOSIS — R63.4 WEIGHT LOSS: ICD-10-CM

## 2025-04-21 DIAGNOSIS — R10.13 DYSPEPSIA: ICD-10-CM

## 2025-04-21 DIAGNOSIS — I49.3 SYMPTOMATIC PVCS: Primary | ICD-10-CM

## 2025-04-21 DIAGNOSIS — Z13.228 SCREENING FOR ENDOCRINE, METABOLIC AND IMMUNITY DISORDER: ICD-10-CM

## 2025-04-21 DIAGNOSIS — K21.00 GASTROESOPHAGEAL REFLUX DISEASE WITH ESOPHAGITIS, UNSPECIFIED WHETHER HEMORRHAGE: ICD-10-CM

## 2025-04-21 LAB
ALBUMIN SERPL-MCNC: 5.4 G/DL (ref 3.2–4.8)
ALBUMIN/GLOB SERPL: 1.9 {RATIO} (ref 1–2)
ALP LIVER SERPL-CCNC: 36 U/L (ref 45–117)
ALT SERPL-CCNC: 15 U/L (ref 10–49)
ANION GAP SERPL CALC-SCNC: 7 MMOL/L (ref 0–18)
AST SERPL-CCNC: 20 U/L (ref ?–34)
BASOPHILS # BLD AUTO: 0.03 X10(3) UL (ref 0–0.2)
BASOPHILS NFR BLD AUTO: 0.4 %
BILIRUB SERPL-MCNC: 0.7 MG/DL (ref 0.3–1.2)
BUN BLD-MCNC: 15 MG/DL (ref 9–23)
CALCIUM BLD-MCNC: 10.6 MG/DL (ref 8.7–10.6)
CHLORIDE SERPL-SCNC: 103 MMOL/L (ref 98–112)
CHOLEST SERPL-MCNC: 159 MG/DL (ref ?–200)
CO2 SERPL-SCNC: 31 MMOL/L (ref 21–32)
CREAT BLD-MCNC: 0.97 MG/DL (ref 0.7–1.3)
EGFRCR SERPLBLD CKD-EPI 2021: 108 ML/MIN/1.73M2 (ref 60–?)
EOSINOPHIL # BLD AUTO: 0.15 X10(3) UL (ref 0–0.7)
EOSINOPHIL NFR BLD AUTO: 2.1 %
ERYTHROCYTE [DISTWIDTH] IN BLOOD BY AUTOMATED COUNT: 12 %
EST. AVERAGE GLUCOSE BLD GHB EST-MCNC: 105 MG/DL (ref 68–126)
FASTING PATIENT LIPID ANSWER: YES
FASTING STATUS PATIENT QL REPORTED: YES
GLOBULIN PLAS-MCNC: 2.8 G/DL (ref 2–3.5)
GLUCOSE BLD-MCNC: 94 MG/DL (ref 70–99)
HBA1C MFR BLD: 5.3 % (ref ?–5.7)
HCT VFR BLD AUTO: 48.4 % (ref 39–53)
HDLC SERPL-MCNC: 47 MG/DL (ref 40–59)
HGB BLD-MCNC: 16.9 G/DL (ref 13–17.5)
IMM GRANULOCYTES # BLD AUTO: 0.01 X10(3) UL (ref 0–1)
IMM GRANULOCYTES NFR BLD: 0.1 %
LDLC SERPL CALC-MCNC: 96 MG/DL (ref ?–100)
LYMPHOCYTES # BLD AUTO: 2.58 X10(3) UL (ref 1–4)
LYMPHOCYTES NFR BLD AUTO: 37 %
MCH RBC QN AUTO: 31.3 PG (ref 26–34)
MCHC RBC AUTO-ENTMCNC: 34.9 G/DL (ref 31–37)
MCV RBC AUTO: 89.6 FL (ref 80–100)
MONOCYTES # BLD AUTO: 0.7 X10(3) UL (ref 0.1–1)
MONOCYTES NFR BLD AUTO: 10 %
NEUTROPHILS # BLD AUTO: 3.51 X10 (3) UL (ref 1.5–7.7)
NEUTROPHILS # BLD AUTO: 3.51 X10(3) UL (ref 1.5–7.7)
NEUTROPHILS NFR BLD AUTO: 50.4 %
NONHDLC SERPL-MCNC: 112 MG/DL (ref ?–130)
OSMOLALITY SERPL CALC.SUM OF ELEC: 293 MOSM/KG (ref 275–295)
PLATELET # BLD AUTO: 244 10(3)UL (ref 150–450)
POTASSIUM SERPL-SCNC: 4.6 MMOL/L (ref 3.5–5.1)
PROT SERPL-MCNC: 8.2 G/DL (ref 5.7–8.2)
RBC # BLD AUTO: 5.4 X10(6)UL (ref 4.3–5.7)
SODIUM SERPL-SCNC: 141 MMOL/L (ref 136–145)
TRIGL SERPL-MCNC: 87 MG/DL (ref 30–149)
TSI SER-ACNC: 1.52 UIU/ML (ref 0.55–4.78)
VIT B12 SERPL-MCNC: 433 PG/ML (ref 211–911)
VLDLC SERPL CALC-MCNC: 14 MG/DL (ref 0–30)
WBC # BLD AUTO: 7 X10(3) UL (ref 4–11)

## 2025-04-21 PROCEDURE — 80053 COMPREHEN METABOLIC PANEL: CPT

## 2025-04-21 PROCEDURE — 36415 COLL VENOUS BLD VENIPUNCTURE: CPT

## 2025-04-21 PROCEDURE — 80061 LIPID PANEL: CPT

## 2025-04-21 PROCEDURE — 83036 HEMOGLOBIN GLYCOSYLATED A1C: CPT

## 2025-04-21 PROCEDURE — 85025 COMPLETE CBC W/AUTO DIFF WBC: CPT

## 2025-04-21 PROCEDURE — 82607 VITAMIN B-12: CPT

## 2025-04-21 PROCEDURE — 84443 ASSAY THYROID STIM HORMONE: CPT

## 2025-04-21 RX ORDER — PANTOPRAZOLE SODIUM 40 MG/1
40 TABLET, DELAYED RELEASE ORAL
COMMUNITY
Start: 2025-04-21 | End: 2025-04-21

## 2025-04-21 RX ORDER — MELATONIN 5 MG
TABLET,CHEWABLE ORAL
COMMUNITY
Start: 2023-12-01

## 2025-04-21 RX ORDER — OMEPRAZOLE 40 MG/1
40 CAPSULE, DELAYED RELEASE ORAL DAILY
Qty: 90 CAPSULE | Refills: 0 | Status: SHIPPED | OUTPATIENT
Start: 2025-04-21

## 2025-04-21 NOTE — PROGRESS NOTES
CHIEF COMPLAINT:   Chief Complaint   Patient presents with    Routine Physical    Weight Loss     Has concerned about Weight loss  states that in the last 2 yrs  29 lb with out try/ Also has symptoms  acid reflux, fatigue, and nausea           HPI:   Bebo Hendricks is a 30 year old male who presents for a physical exam.     Wt Readings from Last 6 Encounters:   04/21/25 140 lb (63.5 kg)   11/11/24 150 lb (68 kg)   01/15/24 160 lb (72.6 kg)   12/12/23 169 lb 3.2 oz (76.7 kg)   12/05/23 168 lb 3.2 oz (76.3 kg)   11/10/23 170 lb 3.2 oz (77.2 kg)     Body mass index is 20.67 kg/m².     History of Present Illness  Bebo Hendricks is a 30-year-old male with gastroesophageal reflux disease who presents with weight loss and gastrointestinal symptoms.    Over the past six months, he has experienced unintentional weight loss of approximately 10 pounds without significant changes in eating habits. This weight loss is concerning to him.    He no longer experiences heartburn but has increased burping, occurring almost every time he eats or drinks, including water. These symptoms lessen when he is not anxious. He also experiences occasional heart palpitations, which he believes are related to his acid reflux, as they often precede the need to burp.    He is currently taking pantoprazole 40 mg daily for GERD but continues to experience symptoms. He uses Tums almost daily to manage upset stomach and burping after meals. He has not been taking famotidine or Pepcid.    He reports a dry mouth and a sensation of pills getting stuck in his throat when swallowing medications, which include sertraline, alprazolam, and pantoprazole. He has stopped taking divalproex, previously used for migraines, due to difficulties in obtaining a refill and has not experienced migraines since discontinuation.    His diet consists mainly of proteins, and he avoids fried foods, which he identifies as a trigger for his symptoms. Foods high in  carbohydrates, like pasta, tend to exacerbate his symptoms.    He has a history of stress-related exacerbations of his symptoms, noting that stress at work and during previous periods has coincided with increased symptoms. 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    He has a history of anxiety and is currently taking sertraline 75 mg in the morning and alprazolam 0.25 mg as needed, approximately every other day, to manage anxiety-related symptoms such as dizziness and lightheadedness.       Current Medications[1]   Past Medical History[2]   Past Surgical History[3]   Family History[4]   Social History:   Short Social Hx on File[5]  Occ: mental health. : no. Children: no.   Exercise: minimal.  Diet: watches minimally and watches fats closely     REVIEW OF SYSTEMS:   Negative except for what is mentioned in HPI.     Screenings:   1.    2.    3.    4.    5.    6.    7.    8.    9.               EXAM:   /70   Pulse 72   Temp 98 °F (36.7 °C) (Temporal)   Resp 20   Ht 5' 9\" (1.753 m)   Wt 140 lb (63.5 kg)   SpO2 98%   BMI 20.67 kg/m²   Body mass index is 20.67 kg/m².   GENERAL: well developed, well nourished,in no apparent distress  SKIN: no rashes,no suspicious lesions  HEENT: atraumatic, normocephalic,ears and throat are clear  EYES:PERRLA, conjunctiva are clear  NECK: supple,no adenopathy,no bruits  LUNGS: clear to auscultation  CARDIO: nl s1 and s2, RRR without murmur  GI: no masses, HSM or tenderness  MUSCULOSKELETAL: back is not tender,FROM of the back  EXTREMITIES: no cyanosis, clubbing or edema  NEURO: Oriented times three,cranial nerves are intact,motor and sensory are grossly intact    Physical Exam  ABDOMEN: Mild tenderness in the middle abdomen    Labs:   Lab Results   Component Value Date/Time    WBC 10.4 11/06/2023 03:08 PM    HGB 15.6 11/06/2023 03:08 PM    .0 11/06/2023 03:08 PM      Lab Results   Component Value Date/Time    GLU 87  11/06/2023 03:08 PM     11/06/2023 03:08 PM    K 3.8 11/06/2023 03:08 PM     11/06/2023 03:08 PM    CO2 26.0 11/06/2023 03:08 PM    CREATSERUM 0.92 11/06/2023 03:08 PM    CA 9.4 11/06/2023 03:08 PM    ALB 4.2 11/06/2023 03:08 PM    TP 7.9 11/06/2023 03:08 PM    ALKPHO 39 (L) 11/06/2023 03:08 PM    AST 10 (L) 11/06/2023 03:08 PM    ALT 21 11/06/2023 03:08 PM    BILT 0.4 11/06/2023 03:08 PM    TSH 1.030 11/06/2023 03:08 PM        Lab Results   Component Value Date/Time    CHOLEST 120 09/07/2023 09:51 AM    HDL 34 (L) 09/07/2023 09:51 AM    TRIG 88 09/07/2023 09:51 AM    LDL 69 09/07/2023 09:51 AM    NONHDLC 86 09/07/2023 09:51 AM       No results found for: \"A1C\"   Lab Results   Component Value Date    VITD 30.9 10/13/2020         Imaging:   No results found.  Assessment & Plan  //Gastroesophageal Reflux Disease (GERD)  //Dyspepsia  Persistent GERD symptoms, including frequent burping, occasional nausea, and dyspepsia, despite pantoprazole 40 mg daily. Experiences weight loss and has esophageal issues requiring follow-up endoscopy every three years. Stress and anxiety exacerbate symptoms. Current medication may be insufficient, with potential silent ulcers or other GI issues. A CT of the abdomen with contrast is considered. Switching to omeprazole is proposed. Famotidine and Beano suggested for additional symptom management. Blood work ordered for vitamin deficiencies and other potential issues.  - Order CT of the abdomen with contrast.  - Refer to GI specialist for further evaluation and management.  - Switch to omeprazole 40 mg twice daily for 7 days, then reduce to daily.  - Advise famotidine as needed for severe symptoms.  - Encourage Beano for gas and dyspepsia.  - Order blood work for vitamin deficiencies and other potential issues.    //Generalized Anxiety Disorder (KARIN)  Experiences anxiety in stressful work environments, leading to dizziness and lightheadedness. Currently on sertraline 75 mg in  the morning and alprazolam 0.25 mg as needed, approximately every other day. Potential for desensitization to alprazolam. Advised to continue regular follow-up with a psychiatrist.  - Continue sertraline 75 mg in the morning.  - Use alprazolam 0.25 mg as needed, mindful of potential desensitization.  - Continue regular follow-up with a psychiatrist.    //Episodic Cluster Headache  Reports improvement in headaches and no longer experiences migraines. Previously used divalproex for migraine management but discontinued due to refill issues and lack of recent migraines.    //Symptomatic PVCs  Holter monitor notable for rare PVCs that seem to align with symptoms.   -discontinue metoprolol, no longer taking. Baseline heart rate seems to be in the 50s per holter.  -Can consider EP evaluation if symptoms persist/worsen.     General Health Maintenance  Up to date on vaccinations and does not require prostate screening. Occasionally uses a multivitamin and melatonin. Full blood panel ordered to check for diabetes, cholesterol, anemia, liver function, kidney function, thyroid function, and vitamin B12 levels.  - Order full blood panel for diabetes, cholesterol, anemia, liver function, kidney function, thyroid function, and vitamin B12 levels.    Follow-up  Requires follow-up for ongoing management of GERD and anxiety, and further evaluation by a GI specialist. Instructions for CT scan scheduling and medication changes provided in the after-visit summary.  - Schedule follow-up with GI specialist.  - Provide after-visit summary with CT scan scheduling and medication change instructions.    Recording duration: 22 minutes    HEALTH MAINTENANCE:   -Vaccinations: Prevnar not indicated, Shingrix not indicated, Flu not indicated, COVID due  -Colonoscopy: -  -Diabetes screening: Last A1c value was  % done  .  -Lipid screening: Cholesterol: 120, done on 9/7/2023.  HDL Cholesterol: 34, done on 9/7/2023.  TriGlycerides 88, done on  9/7/2023.  LDL Cholesterol: 69, done on 9/7/2023.   -Smoking: Denies  -Alcohol: Rare  -Marijuana: Denies  -Recreational drug: Denies    Return in about 1 year (around 4/21/2026) for Physical Exam .    Eve Zayas MD   Internal Medicine            The following individual(s) verbally consented to be recorded using ambient AI listening technology and understand that they can each withdraw their consent to this listening technology at any point by asking the clinician to turn off or pause the recording:    Patient name: Bebo Hendricks               [1]   Current Outpatient Medications   Medication Sig Dispense Refill    Melatonin 5 MG Oral Chew Tab Melatonin 5 MG Oral Chew Tab, [RxNorm: 1327499]      sertraline (ZOLOFT) 50 MG Oral Tab Take 1.5 tablets (75 mg total) by mouth daily.      Omeprazole 40 MG Oral Capsule Delayed Release Take 1 capsule (40 mg total) by mouth daily. 90 capsule 0    ALPRAZolam 0.25 MG Oral Tab Take 1 tablet (0.25 mg total) by mouth daily as needed for Sleep or Anxiety. 30 tablet 0    fluticasone propionate 50 MCG/ACT Nasal Suspension 2 sprays by Each Nare route daily. 48 g 0    Multiple Vitamins-Minerals (MULTIVITAMIN ADULT OR) Take by mouth.     [2]   Past Medical History:   Abdominal pain    Anxiety    Belching    Bloating    Chest pain    Chronic cough    Temporarily after eating    Decorative tattoo    Fatigue    Frequent use of laxatives    Headache disorder    Heart palpitations    Heartburn    History of depression    History of mental disorder    Indigestion    Loss of appetite    Sleep disturbance    Stress    Uncomfortable fullness after meals    Weight loss   [3]   Past Surgical History:  Procedure Laterality Date    Other surgical history      wisdom teeth extracted    Tonsillectomy     [4]   Family History  Problem Relation Age of Onset    Asthma Brother     ADHD Brother     Anxiety Brother     Thyroid Disorder Mother     Anxiety Mother     Other (Other) Mother         xanax     Thyroid Disorder Maternal Grandmother     Breast Cancer Maternal Grandmother     Anxiety Sister    [5]   Social History  Socioeconomic History    Marital status: Single   Occupational History    Occupation: [employer]   Tobacco Use    Smoking status: Never    Smokeless tobacco: Never   Vaping Use    Vaping status: Former    Substances: Nicotine, CBD   Substance and Sexual Activity    Alcohol use: Yes     Alcohol/week: 1.0 standard drink of alcohol     Types: 1 Cans of beer per week     Comment: Social-rare    Drug use: Not Currently     Types: Cannabis     Comment: 2014-tried cannabis in college. uses CBD oil    Sexual activity: Not Currently   Other Topics Concern     Service No    Caffeine Concern Yes     Comment: coffee daily    Sleep Concern Yes    Stress Concern Yes     Comment: anxiety    Exercise No    Seat Belt Yes     Social Drivers of Health     Food Insecurity: No Food Insecurity (4/21/2025)    NCSS - Food Insecurity     Worried About Running Out of Food in the Last Year: No     Ran Out of Food in the Last Year: No   Transportation Needs: No Transportation Needs (4/21/2025)    NCSS - Transportation     Lack of Transportation: No   Housing Stability: Not At Risk (4/21/2025)    NCSS - Housing/Utilities     Has Housing: Yes     Worried About Losing Housing: No     Unable to Get Utilities: No

## 2025-04-21 NOTE — PATIENT INSTRUCTIONS
VISIT SUMMARY:  During your visit, we discussed your ongoing issues with gastroesophageal reflux disease (GERD), weight loss, and anxiety. We reviewed your current symptoms, medications, and lifestyle factors contributing to your condition. We also discussed your history of migraines and current mental health status.    YOUR PLAN:  -GASTROESOPHAGEAL REFLUX DISEASE (GERD): GERD is a condition where stomach acid frequently flows back into the tube connecting your mouth and stomach, causing irritation. We will switch your medication to omeprazole 40 mg twice daily for 7 days, then reduce to once daily. You can use famotidine as needed for severe symptoms and Beano for gas and dyspepsia. We have ordered a CT scan of your abdomen with contrast and blood work to check for vitamin deficiencies and other potential issues. A referral to a GI specialist has been made for further evaluation.    -GENERALIZED ANXIETY DISORDER (KARIN): KARIN is a condition characterized by persistent and excessive worry about various aspects of life. Continue taking sertraline 75 mg in the morning and use alprazolam 0.25 mg as needed, being mindful of potential desensitization. Regular follow-up with your psychiatrist is advised.    -EPISODIC CLUSTER HEADACHE: Cluster headaches are severe headaches on one side of the head, often around the eye. You have reported improvement and no longer experience migraines. No changes to your current management are needed.    -GENERAL HEALTH MAINTENANCE: You are up to date on vaccinations and do not require prostate screening at this time. We have ordered a full blood panel to check for diabetes, cholesterol, anemia, liver function, kidney function, thyroid function, and vitamin B12 levels.    INSTRUCTIONS:  Please schedule a follow-up appointment with a GI specialist for further evaluation of your GERD. Additionally, follow the instructions provided for scheduling your CT scan and medication changes. Continue  regular follow-up with your psychiatrist for anxiety management.    Contains text generated by Noris

## 2025-04-22 ENCOUNTER — TELEPHONE (OUTPATIENT)
Dept: INTERNAL MEDICINE CLINIC | Facility: CLINIC | Age: 30
End: 2025-04-22

## 2025-04-22 DIAGNOSIS — R10.13 DYSPEPSIA: ICD-10-CM

## 2025-04-22 DIAGNOSIS — K21.00 GASTROESOPHAGEAL REFLUX DISEASE WITH ESOPHAGITIS, UNSPECIFIED WHETHER HEMORRHAGE: Primary | ICD-10-CM

## 2025-04-22 DIAGNOSIS — R63.4 WEIGHT LOSS: ICD-10-CM

## 2025-04-22 NOTE — TELEPHONE ENCOUNTER
Rec. Requesting additional info. Faxed ov note from 4/22. Rec confirmation.fax on form [id number]. Phone [phone]. Sent for scanning.

## 2025-04-22 NOTE — TELEPHONE ENCOUNTER
Incoming (mail or fax):  fax  [facility]  Documentation given to:  tri in      Additional info requested for CT scan approval

## 2025-04-23 NOTE — TELEPHONE ENCOUNTER
Future Appointments   Date Time Provider Department Center   4/25/2025 11:00 AM Le Gordillo LCSW, CAD [facility] [place]   4/28/2025 10:15 AM BBK CT RM1 BBK CT [place]   5/2/2025 11:00 AM Le Gordillo LCSW, SEAN [facility] [place]   5/9/2025 10:00 AM Le Gordillo LCSW, [facility] [place]   5/27/2025 11:00 AM Coni Denise, DONA [facility] [id number]     CT APPENDIX ABD/PEL W CONTRAST (CPT=74177)  not covered     Pt notified through [employer]     In Dr bin for review . What would you like pt to do?

## 2025-04-23 NOTE — TELEPHONE ENCOUNTER
Incoming (mail or fax):  fax  Received from:  [facility]  Documentation given to:  Triage incoming    CT not covered

## 2025-04-24 NOTE — TELEPHONE ENCOUNTER
Tried appealing this with peer to peer. Patient had weight loss as well in this time period and he has gastritis. Getting treatment.     Called today: [phone]  But left on hold.     Please call this number to set up appointment for peer to peer. Tomorrow after 10AM would be ok or sometime on Monday when I have an opening. Please put a hold on my schedule for 20 minutes when that peer to peer should be

## 2025-04-25 NOTE — TELEPHONE ENCOUNTER
Spoke to Dr. Emerson from [facility] for peer to peer. She reports that healthcare plan requires abdominal ultrasound first and if that is negative then we can pursue CT with contrast. Due to health plan, we are not allowed to do CT with contrast first. We could appeal this and ask if they would approve CT with contrast without abdominal ultrasound.     Please call patient to review outcome. If he is amenable, we can order the abdominal ultrasound first. Or if he would like, we can attempt appeal.

## 2025-04-25 NOTE — TELEPHONE ENCOUNTER
Called rashaad. Peer to peer set up at 1115 today with a dr. Dora umana. Provided our  number as primary and your cell as alt call. Messaged  to put hold on your 1120 slot.   Order #[id number].  Fyi-thanks!

## 2025-05-03 ENCOUNTER — HOSPITAL ENCOUNTER (OUTPATIENT)
Dept: ULTRASOUND IMAGING | Age: 30
Discharge: HOME | End: 2025-05-03
Attending: STUDENT IN AN ORGANIZED HEALTH CARE EDUCATION/TRAINING PROGRAM
Payer: COMMERCIAL

## 2025-05-03 DIAGNOSIS — R10.13 DYSPEPSIA: ICD-10-CM

## 2025-05-03 DIAGNOSIS — R63.4 WEIGHT LOSS: ICD-10-CM

## 2025-05-03 DIAGNOSIS — K21.00 GASTROESOPHAGEAL REFLUX DISEASE WITH ESOPHAGITIS, UNSPECIFIED WHETHER HEMORRHAGE: ICD-10-CM

## 2025-05-03 PROCEDURE — 76700 US EXAM ABDOM COMPLETE: CPT | Performed by: STUDENT IN AN ORGANIZED HEALTH CARE EDUCATION/TRAINING PROGRAM

## 2025-05-07 ENCOUNTER — TELEPHONE (OUTPATIENT)
Dept: ADMINISTRATIVE | Age: 30
End: 2025-05-07

## 2025-05-07 NOTE — TELEPHONE ENCOUNTER
Hello,    A Peer to Peer for your Patient's Ct Scan is needed.  Patient is scheduled on 5.9.    Please call [facility] at [phone]  Case: [id number]      Please advise on final determination    Rody KENDALL  Patient Referral Representative  Prior Authorizations & Referrals  [facility]

## 2025-05-07 NOTE — TELEPHONE ENCOUNTER
CT denied. Do you want to do peer-peer?     Future Appointments   Date Time Provider Department Center   5/9/2025 10:00 AM Le [place]ordillo LCSW, KIRSTEN [facility] [place]   5/9/2025  2:45 PM SIRIA CT RM1 [facility] [place]   5/16/2025 11:00 AM Le Gordillo LCSW, KIRSTEN [facility] [place]   5/23/2025 10:00 AM Le Gordillo LCSW, KIRSTEN [facility] [place]   5/27/2025 11:00 AM Coni Denise APRN [id number]   5/30/2025 11:00 AM Le Gordillo LCSW, KIRSTEN [facility] [place]   11/6/2025 10:30 AM SIRIA US RM1 SIR[facility] US [place]

## 2025-05-12 ENCOUNTER — HOSPITAL ENCOUNTER (EMERGENCY)
Facility: HOSPITAL | Age: 30
Discharge: HOME | End: 2025-05-12
Attending: EMERGENCY MEDICINE
Payer: OTHER MISCELLANEOUS

## 2025-05-12 VITALS
RESPIRATION RATE: 18 BRPM | SYSTOLIC BLOOD PRESSURE: 120 MMHG | HEIGHT: 69 IN | DIASTOLIC BLOOD PRESSURE: 76 MMHG | TEMPERATURE: 99 F | WEIGHT: 140 LBS | OXYGEN SATURATION: 98 % | HEART RATE: 72 BPM | BODY MASS INDEX: 20.73 KG/M2

## 2025-05-12 DIAGNOSIS — Z77.21 EXPOSURE TO BODY FLUID: Primary | ICD-10-CM

## 2025-05-12 NOTE — ED INITIAL ASSESSMENT (HPI)
Pt to ER ambulatory, is employee at [employer]. Emptying a patient's figueroa with hx of ESBL and urine spashed into mouth. Told by employer to come to ER. No complaints at this time.

## 2025-05-13 NOTE — ED PROVIDER NOTES
Patient Seen in: [facility] Emergency Department      History     Chief Complaint   Patient presents with    Exposure,Chem Occupational     Stated Complaint: ESBL Urine spashed into mouth. 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  There was no obvious blood in it.  He spit it out and rinsed his mouth right away.      Objective:     Past Medical History:    Abdominal pain    Anxiety    Belching    Bloating    Chest pain    Chronic cough    Temporarily after eating    Decorative tattoo    Fatigue    Frequent use of laxatives    Headache disorder    Heart palpitations    Heartburn    History of depression    History of mental disorder    Indigestion    Loss of appetite    Sleep disturbance    Stress    Uncomfortable fullness after meals    Weight loss              Past Surgical History:   Procedure Laterality Date    Other surgical history      wisdom teeth extracted    Tonsillectomy                  Social History     Socioeconomic History    Marital status: Single   Occupational History    Occupation: [employer]   Tobacco Use    Smoking status: Never    Smokeless tobacco: Never   Vaping Use    Vaping status: Former    Substances: Nicotine, CBD   Substance and Sexual Activity    Alcohol use: Yes     Alcohol/week: 1.0 standard drink of alcohol     Types: 1 Cans of beer per week     Comment: Social-rare    Drug use: Not Currently     Types: Cannabis     Comment: 2014-tried cannabis in college. uses CBD oil    Sexual activity: Not Currently   Other Topics Concern     Service No    Caffeine Concern Yes     Comment: coffee daily    Sleep Concern Yes    Stress Concern Yes     Comment: anxiety    Exercise No    Seat Belt Yes     Social Drivers of Health     Food Insecurity: No Food Insecurity (4/21/2025)    NCSS  - Food Insecurity     Worried About Running Out of Food in the Last Year: No     Ran Out of Food in the Last Year: No   Transportation Needs: No Transportation Needs (4/21/2025)    NCSS - Transportation     Lack of Transportation: No   Housing Stability: Not At Risk (4/21/2025)    NCSS - Housing/Utilities     Has Housing: Yes     Worried About Losing Housing: No     Unable to Get Utilities: No                                Physical Exam     ED Triage Vitals [05/12/25 1742]   /76   Pulse 72   Resp 18   Temp 98.7 °F (37.1 °C)   Temp src Temporal   SpO2 98 %   O2 Device None (Room air)       Current Vitals:   Vital Signs  BP: 120/76  Pulse: 72  Resp: 18  Temp: 98.7 °F (37.1 °C)  Temp src: Temporal    Oxygen Therapy  SpO2: 98 %  O2 Device: None (Room air)          Physical Exam  Vitals and nursing note reviewed.   Constitutional:       Appearance: He is well-developed.   HENT:      Head: Normocephalic and atraumatic.   Cardiovascular:      Rate and Rhythm: Normal rate and regular rhythm.      Heart sounds: Normal heart sounds.   Pulmonary:      Effort: Pulmonary effort is normal.      Breath sounds: Normal breath sounds.   Skin:     General: Skin is warm and dry.   Neurological:      Mental Status: He is alert and oriented to person, place, and time.                 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  Rinsed her right away and was advised to come here for evaluation.  He would not be at risk for HIV or hep C.  I do not think he needs any baseline testing and he does not require any postexposure prophylaxis.  Did offer to do baseline testing here if he preferred but he is comfortable not doing that.  He will still need to follow-up with occupational health.      Past Medical  History-none    Differential diagnosis before testing included blood exposure, urine exposure      Testing ordered during this visit included none            Disposition:        Discharge  I have discussed with the patient the results of test, differential diagnosis, treatment plan, warning signs and symptoms which should prompt immediate return.  They expressed understanding of these instructions and agrees to the following plan provided.  They were given written discharge instructions and agrees to return for any concerns and voiced understanding and all questions were answered.      Medical Decision Making      Disposition and Plan     Clinical Impression:  1. Exposure to body fluid         Disposition:  Discharge  5/12/2025  7:51 pm    Follow-up:  [facility]  [address] [place] [address] Dr Gomez [address] [place] [id number] [phone]  Call in 1 day  Work related injury follow up          Medications Prescribed:  Current Discharge Medication List                Supplementary Documentation:

## 2025-05-13 NOTE — DISCHARGE INSTRUCTIONS
You do need to follow-up with occupational health but did not require any postexposure testing and would not recommend any postexposure prophylaxis.

## 2025-05-14 NOTE — TELEPHONE ENCOUNTER
Patient Comment: I have none left since I started the first week taking two a day     Is it okay that he's taking 2 a day? He was only dispensed #30 tabs, still has two 30 tab refills on file with pharmacy.

## 2025-05-15 RX ORDER — OMEPRAZOLE 40 MG/1
40 CAPSULE, DELAYED RELEASE ORAL DAILY
Qty: 90 CAPSULE | Refills: 0 | Status: SHIPPED | OUTPATIENT
Start: 2025-05-15

## 2025-07-08 RX ORDER — OMEPRAZOLE 40 MG/1
40 CAPSULE, DELAYED RELEASE ORAL DAILY
Qty: 90 CAPSULE | Refills: 0 | Status: SHIPPED | OUTPATIENT
Start: 2025-07-08

## 2025-07-08 NOTE — TELEPHONE ENCOUNTER
Gastrointestional Medication Protocol [id number]/08/2025 07:47 AM   Protocol Details In person appointment or virtual visit in the past 12 mos or appointment in next 3 mos    Medication is active on med list   //Gastroesophageal Reflux Disease (GERD)  //Dyspepsia  Persistent GERD symptoms, including frequent burping, occasional nausea, and dyspepsia, despite pantoprazole 40 mg daily. Experiences weight loss and has esophageal issues requiring follow-up endoscopy every three years. Stress and anxiety exacerbate symptoms. Current medication may be insufficient, with potential silent ulcers or other GI issues. A CT of the abdomen with contrast is considered. Switching to omeprazole is proposed. Famotidine and Beano suggested for additional symptom management. Blood work ordered for vitamin deficiencies and other potential issues.  - Order CT of the abdomen with contrast.  - Refer to GI specialist for further evaluation and management.  - Switch to omeprazole 40 mg twice daily for 7 days, then reduce to daily.  - Advise famotidine as needed for severe symptoms.  - Encourage Beano for gas and dyspepsia.  - Order blood work for vitamin deficiencies and other potential issues.    Message sent to pt to check on dose.

## (undated) DIAGNOSIS — G44.011 INTRACTABLE EPISODIC CLUSTER HEADACHE: ICD-10-CM

## (undated) NOTE — LETTER
Date: 3/27/2023    Patient Name: Girma Leary          To Whom it may concern: This letter has been written at the patient's request. The above patient was seen at the [facility] for treatment of a medical condition. This patient should be excused from attending work on 3/27/2023.  He may return         Sincerely,    Chaim Burns MD